# Patient Record
Sex: FEMALE | Race: WHITE | Employment: FULL TIME | URBAN - METROPOLITAN AREA
[De-identification: names, ages, dates, MRNs, and addresses within clinical notes are randomized per-mention and may not be internally consistent; named-entity substitution may affect disease eponyms.]

---

## 2017-07-16 ENCOUNTER — HOSPITAL ENCOUNTER (EMERGENCY)
Facility: CLINIC | Age: 47
Discharge: HOME | End: 2017-07-16
Attending: PHYSICIAN ASSISTANT | Admitting: PHYSICIAN ASSISTANT
Payer: COMMERCIAL

## 2017-07-16 ENCOUNTER — APPOINTMENT (OUTPATIENT)
Dept: GENERAL RADIOLOGY | Facility: CLINIC | Age: 47
End: 2017-07-16
Attending: PHYSICIAN ASSISTANT
Payer: COMMERCIAL

## 2017-07-16 VITALS
DIASTOLIC BLOOD PRESSURE: 91 MMHG | SYSTOLIC BLOOD PRESSURE: 154 MMHG | WEIGHT: 158 LBS | OXYGEN SATURATION: 98 % | HEIGHT: 65 IN | RESPIRATION RATE: 16 BRPM | BODY MASS INDEX: 26.33 KG/M2 | TEMPERATURE: 98.1 F | HEART RATE: 92 BPM

## 2017-07-16 DIAGNOSIS — S89.92XA INJURY OF LOWER EXTREMITY, LEFT, INITIAL ENCOUNTER: ICD-10-CM

## 2017-07-16 PROCEDURE — 99214 OFFICE O/P EST MOD 30 MIN: CPT

## 2017-07-16 PROCEDURE — 99213 OFFICE O/P EST LOW 20 MIN: CPT | Performed by: PHYSICIAN ASSISTANT

## 2017-07-16 PROCEDURE — 73590 X-RAY EXAM OF LOWER LEG: CPT | Mod: LT

## 2017-07-16 NOTE — ED NOTES
Patient here for pain in the left leg after a fall at home.  Patient presents in wheelchair to the urgent care.

## 2017-07-16 NOTE — ED AVS SNAPSHOT
[facility] Emergency Department    [address] [address]    Phone:  [phone]    Fax:  [phone]                                       Gladys Mullins   MRN: [id number]    Department:  [facility] Emergency Department   Date of Visit:  7/16/2017           After Visit Summary Signature Page     I have received my discharge instructions, and my questions have been answered. I have discussed any challenges I see with this plan with the nurse or doctor.    ..........................................................................................................................................  Patient/Patient Representative Signature      ..........................................................................................................................................  Patient Representative Print Name and Relationship to Patient    ..................................................               ................................................  Date                                            Time    ..........................................................................................................................................  Reviewed by Signature/Title    ...................................................              ..............................................  Date                                                            Time

## 2017-07-16 NOTE — ED AVS SNAPSHOT
[facility] Emergency Department    [address] [address]    Phone:  [phone]    Fax:  [phone]                                       Gladys Mullins   MRN: [id number]    Department:  [facility] Emergency Department   Date of Visit:  7/16/2017           Patient Information     Date Of Birth          1970        Your diagnoses for this visit were:     Injury of lower extremity, left, initial encounter        You were seen by Jake Mg PA-C.      24 Hour Appointment Hotline       To make an appointment at any [facility], call [phone] ([phone]). If you don't have a family doctor or clinic, we will help you find one. [facility] clinics are conveniently located to serve the needs of you and your family.          ED Discharge Orders     Cam Walker Adj Ankle                    Review of your medicines      Our records show that you are taking the medicines listed below. If these are incorrect, please call your family doctor or clinic.        Dose / Directions Last dose taken    * lisinopril-hydrochlorothiazide 20-25 MG per tablet   Commonly known as:  PRINZIDE/ZESTORETIC   Dose:  1 tablet        Take 1 tablet by mouth daily.   Refills:  0        * LISINOPRIL-HYDROCHLOROTHIAZIDE PO   Dose:  1 tablet        Take 1 tablet by mouth daily.   Refills:  0        PEPCID PO   Dose:  20 mg        Take 20 mg by mouth daily as needed.   Refills:  0        * Notice:  This list has 2 medication(s) that are the same as other medications prescribed for you. Read the directions carefully, and ask your doctor or other care provider to review them with you.            Procedures and tests performed during your visit     Tib/Fib XR, left      Orders Needing Specimen Collection     None      Pending Results     Date and Time Order Name Status Description    7/16/2017 1528 Tib/Fib XR, left Preliminary             Pending Culture Results     No orders found from 7/14/2017 to  "2017.            Pending Results Instructions     If you had any lab results that were not finalized at the time of your Discharge, you can call the ED Lab Result RN at [phone]. You will be contacted by this team for any positive Lab results or changes in treatment. The nurses are available 7 days a week from 10A to 6:30P.  You can leave a message 24 hours per day and they will return your call.        Test Results From Your Hospital Stay        2017  3:45 PM      Narrative     TIBIA AND FIBULA LEFT TWO VIEW   2017 3:36 PM     HISTORY: Midshaft pain after fell through some stairs 2 days ago.    COMPARISON: None.        Impression     IMPRESSION: No evidence of fracture. Bony alignment within normal  limits.                Thank you for choosing [facility]       Thank you for choosing [facility] for your care. Our goal is always to provide you with excellent care. Hearing back from our patients is one way we can continue to improve our services. Please take a few minutes to complete the written survey that you may receive in the mail after you visit with us. Thank you!        [employer] Information     [employer] lets you send messages to your doctor, view your test results, renew your prescriptions, schedule appointments and more. To sign up, go to [email or web address] . Click on \"Log in\" on the left side of the screen, which will take you to the Welcome page. Then click on \"Sign up Now\" on the right side of the page.     You will be asked to enter the access code listed below, as well as some personal information. Please follow the directions to create your username and password.     Your access code is: [email or web address]  Expires: 10/14/2017  3:55 PM     Your access code will  in 90 days. If you need help or a new code, please call your [facility] clinic or [phone].        Care EveryWhere ID     This is your Care EveryWhere ID. This could be used by other organizations to access your [facility] " medical records  [id number]        Equal Access to Services     ELKE AVILA : Zonia West, mere bartlett, kari pham, waldo naranjo. So [facility] [phone].    ATENCIÓN: Si habla español, tiene a prasad disposición servicios gratuitos de asistencia lingüística. Llame al [phone].    We comply with applicable federal civil rights laws and [place] laws. We do not discriminate on the basis of race, color, national origin, age, disability sex, sexual orientation or gender identity.            After Visit Summary       This is your record. Keep this with you and show to your community pharmacist(s) and doctor(s) at your next visit.

## 2017-07-16 NOTE — ED PROVIDER NOTES
"HPI: Gladys Mullins is an 47 year old female who presents for evaluation and treatment of L leg pain.  Patient was walking down some stairs at home 2 days ago when her foot broke through.  She lurched forward, and her leg remained stuck in the stairs.  She had immediate pain.  She has been walking on it for 2 days with discomfort.  She did use a relatives cam walker, and this did help with the pain.  She denies any numbness or tingling in the L foot.           Surgical History:  History reviewed. No pertinent surgical history.     Problem List:  There is no problem list on file for this patient.       Allergies:  Allergies   Allergen Reactions     Codeine Phosphate GI Disturbance        Current Meds:  No current facility-administered medications for this encounter.     Current Outpatient Prescriptions:      lisinopril-hydrochlorothiazide (PRINZIDE,ZESTORETIC) 20-25 MG per tablet, Take 1 tablet by mouth daily., Disp: , Rfl:      LISINOPRIL-HYDROCHLOROTHIAZIDE PO, Take 1 tablet by mouth daily., Disp: , Rfl:      Famotidine (PEPCID PO), Take 20 mg by mouth daily as needed., Disp: , Rfl:      PHYSICAL EXAM:     Vital signs noted and reviewed by Jake Mg  BP (!) 154/91  Pulse 92  Temp 98.1  F (36.7  C) (Oral)  Resp 16  Ht 1.651 m (5' 5\")  Wt 71.7 kg (158 lb)  SpO2 98%  BMI 26.29 kg/m2     PEFR:  General appearance: healthy, alert and no distress  Lungs: normal and clear to auscultation  Heart: S1, S2 normal, no murmur, click, rub or gallop, regular rate and rhythm  Extremities: L leg - No swelling, bruising, or deformity.  Severe pain with manipulation of the fibula mid shaft area.  Full range of motion of ankle and digits.  Digits are neurologically intact.      ASSESSMENT:     1. Injury of lower extremity, left, initial encounter         PLAN:     XR per radiology read: No evidence of fracture. Bony alignment within normal limits.    Patient placed in cam walker supplied in clinic for comfort.  Ice, " elevation, and ibuprofen for comfort.  She was instructed to follow up with her PCP in 2 weeks if symptoms are not improving.     Jake Mg  7/16/2017, 3:25 PM       Jake Mg PA-C  07/16/17 1554

## 2019-11-18 ENCOUNTER — HOSPITAL ENCOUNTER (EMERGENCY)
Facility: CLINIC | Age: 49
Discharge: HOME | End: 2019-11-18
Attending: FAMILY MEDICINE | Admitting: FAMILY MEDICINE
Payer: COMMERCIAL

## 2019-11-18 VITALS
RESPIRATION RATE: 16 BRPM | BODY MASS INDEX: 24.99 KG/M2 | HEIGHT: 65 IN | TEMPERATURE: 98 F | WEIGHT: 150 LBS | DIASTOLIC BLOOD PRESSURE: 93 MMHG | SYSTOLIC BLOOD PRESSURE: 171 MMHG | OXYGEN SATURATION: 98 %

## 2019-11-18 DIAGNOSIS — S05.02XA ABRASION OF LEFT CORNEA, INITIAL ENCOUNTER: ICD-10-CM

## 2019-11-18 PROCEDURE — 99283 EMERGENCY DEPT VISIT LOW MDM: CPT

## 2019-11-18 PROCEDURE — 25000132 ZZH RX MED GY IP 250 OP 250 PS 637: Performed by: FAMILY MEDICINE

## 2019-11-18 PROCEDURE — 99284 EMERGENCY DEPT VISIT MOD MDM: CPT | Mod: Z6 | Performed by: FAMILY MEDICINE

## 2019-11-18 PROCEDURE — 25000125 ZZHC RX 250: Performed by: FAMILY MEDICINE

## 2019-11-18 RX ORDER — FLUOXETINE 10 MG/1
10 CAPSULE ORAL EVERY MORNING
Status: ON HOLD | COMMUNITY
Start: 2019-09-16 | End: 2023-11-30

## 2019-11-18 RX ORDER — TETRACAINE HYDROCHLORIDE 5 MG/ML
1-2 SOLUTION OPHTHALMIC ONCE
Status: COMPLETED | OUTPATIENT
Start: 2019-11-18 | End: 2019-11-18

## 2019-11-18 RX ORDER — POLYMYXIN B SULFATE AND TRIMETHOPRIM 1; 10000 MG/ML; [USP'U]/ML
2 SOLUTION OPHTHALMIC ONCE
Status: COMPLETED | OUTPATIENT
Start: 2019-11-18 | End: 2019-11-18

## 2019-11-18 RX ORDER — HYDROCODONE BITARTRATE AND ACETAMINOPHEN 5; 325 MG/1; MG/1
1-2 TABLET ORAL EVERY 6 HOURS PRN
Qty: 6 TABLET | Refills: 0 | Status: SHIPPED | OUTPATIENT
Start: 2019-11-18 | End: 2022-06-21

## 2019-11-18 RX ORDER — TRAZODONE HYDROCHLORIDE 50 MG/1
50 TABLET, FILM COATED ORAL AT BEDTIME
COMMUNITY
Start: 2019-05-31

## 2019-11-18 RX ADMIN — TETRACAINE HYDROCHLORIDE 1 DROP: 5 SOLUTION OPHTHALMIC at 13:23

## 2019-11-18 RX ADMIN — POLYMYXIN B SULFATE AND TRIMETHOPRIM 2 DROP: 10000; 1 SOLUTION OPHTHALMIC at 14:08

## 2019-11-18 ASSESSMENT — MIFFLIN-ST. JEOR: SCORE: 1311.28

## 2019-11-18 NOTE — LETTER
November 18, 2019      To Whom It May Concern:      Gladys Mullins was seen in our Emergency Department today, 11/18/19.  I expect her condition to improve over the next 2 days.  She may return to work/school when improved.    Sincerely,        Jose Manuel Goncalves MD
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
## 2019-11-18 NOTE — DISCHARGE INSTRUCTIONS
Corneal Abrasion    You have received a scratch or scrape (abrasion) to your cornea. The cornea is the clear part in the front of the eye. This sensitive area is very painful when injured. You may make tears frequently, and your vision may be blurry until the injury heals. You may be sensitive to light.  This part of the body heals quickly. You can expect the pain to go away within 24 to 48 hours. If the abrasion is large or deep, your doctor may apply an eye patch, although this is not always done. An antibiotic ointment or eye drops may also be used to prevent infection.  Numbing drops may be used to relieve the pain temporarily so that your eyes can be examined. But these drops can t be prescribed for home use because that would prevent healing and lead to more serious problems. Also, if you can t feel your eye, there is a chance of accidentally injuring it further without knowing it.  Home care  A cold pack may be applied over the eye (or eye patch) for 20 minutes at a time, to reduce pain. To make a cold pack, put ice cubes in a plastic bag that seals at the top. Wrap the bag in a clean, thin towel or cloth.  You may use acetaminophen or ibuprofen to control pain, unless another pain medicine was prescribed. Note: If you have chronic liver or kidney disease or have ever had a stomach ulcer or GI bleeding, talk with your doctor before using these medicines.  Rest your eyes and don t read until symptoms are gone.  If you use contact lenses, don t wear them until all symptoms are gone.  If your vision is affected by the corneal abrasion or if an eye patch was applied, don t drive a motor vehicle or operate machinery until all symptoms are gone. You may have trouble judging distances using only one eye.  If your eyes are sensitive to light, try wearing sunglasses, or stay indoors until symptoms go away.  Follow-up care  Follow up with your healthcare provider, or as advised.  If no patch was put on your eye and  the pain continues for more than 48 hours, you should have another exam. Contact your healthcare provider to arrange this.  If your eye was patched and you were asked to remove the patch yourself, see your healthcare provider. Contact your healthcare provider if you still have pain after the patch is removed.  If you were given a return appointment for patch removal and re-examination, be sure to keep the appointment. Leaving the patch in place longer than advised could be harmful.  When to seek medical advice  Call your healthcare provider right away if any of these occur.  Increasing eye pain or pain that does not improve after 24 hours  Discharge from the eye  Increasing redness of the eye or swelling of the eyelids  Worsening vision  Symptoms get worse after the abrasion has healed      Date Last Reviewed: 7/1/2017 2000-2018 [employer]. [address] [place] [address]. All rights reserved. This information is not intended as a substitute for professional medical care. Always follow your healthcare professional's instructions.      Return to the Emergency Room if the following occurs:     Severely worsened pain, fever greater than 101, concerning visual changes, or for any concern at anytime.    Or, follow-up with the following provider as we discussed:     Consider [facility] if not improving over the next 48 hours.  See contact information provided for details.    Medications discussed:    Ibuprofen 600 mg every six hours for pain (7 days duration).  Tylenol 1000 mg every six hours for pain (7 days duration).  Therefore, you can alternate these every three hours and do it safely.  Norco (5/325) 1-2 tablets every 8 hours for pain (start with one).  Note, each tab has 325 mg tylenol.  Do not exceed 3000 mg tylenol per 24 hours.  Tetracaine 2 drops every two hours as needed for pain, not to be used beyond 24 hours.  Polytrim 2 drops 3 times a  day over the next 3 days.  Your first dose was given in the ER.    If you received pain-relieving or sedating medication during your time in the ER, avoid alcohol, driving automobiles, or working with machinery.  Also, a responsible adult must stay with you.        Call the Nurse Advice Line at [phone] or [phone] for any concern at anytime.

## 2019-11-18 NOTE — ED AVS SNAPSHOT
[facility] Emergency Department  [address] [address]  Phone:  [phone]  Fax:  [phone]                                    Gladys Mullins   MRN: [id number]    Department:  [facility] Emergency Department   Date of Visit:  11/18/2019           After Visit Summary Signature Page    I have received my discharge instructions, and my questions have been answered. I have discussed any challenges I see with this plan with the nurse or doctor.    ..........................................................................................................................................  Patient/Patient Representative Signature      ..........................................................................................................................................  Patient Representative Print Name and Relationship to Patient    ..................................................               ................................................  Date                                   Time    ..........................................................................................................................................  Reviewed by Signature/Title    ...................................................              ..............................................  Date                                               Time          22EPIC Rev 08/18

## 2022-06-21 ENCOUNTER — HOSPITAL ENCOUNTER (EMERGENCY)
Facility: CLINIC | Age: 52
Discharge: HOME | End: 2022-06-21
Attending: EMERGENCY MEDICINE | Admitting: EMERGENCY MEDICINE
Payer: COMMERCIAL

## 2022-06-21 ENCOUNTER — APPOINTMENT (OUTPATIENT)
Dept: GENERAL RADIOLOGY | Facility: CLINIC | Age: 52
End: 2022-06-21
Attending: EMERGENCY MEDICINE
Payer: COMMERCIAL

## 2022-06-21 VITALS
SYSTOLIC BLOOD PRESSURE: 106 MMHG | HEART RATE: 85 BPM | TEMPERATURE: 97.1 F | BODY MASS INDEX: 24.99 KG/M2 | OXYGEN SATURATION: 98 % | WEIGHT: 150 LBS | HEIGHT: 65 IN | DIASTOLIC BLOOD PRESSURE: 69 MMHG | RESPIRATION RATE: 18 BRPM

## 2022-06-21 DIAGNOSIS — S93.602A FOOT SPRAIN, LEFT, INITIAL ENCOUNTER: ICD-10-CM

## 2022-06-21 PROCEDURE — 99282 EMERGENCY DEPT VISIT SF MDM: CPT | Performed by: EMERGENCY MEDICINE

## 2022-06-21 PROCEDURE — 73630 X-RAY EXAM OF FOOT: CPT | Mod: LT

## 2022-06-21 PROCEDURE — 99283 EMERGENCY DEPT VISIT LOW MDM: CPT | Performed by: EMERGENCY MEDICINE

## 2022-06-21 RX ORDER — HYDROCODONE BITARTRATE AND ACETAMINOPHEN 5; 325 MG/1; MG/1
1 TABLET ORAL EVERY 6 HOURS PRN
Qty: 3 TABLET | Refills: 0 | Status: ON HOLD | OUTPATIENT
Start: 2022-06-21 | End: 2023-11-30

## 2022-06-21 ASSESSMENT — ENCOUNTER SYMPTOMS
GASTROINTESTINAL NEGATIVE: 1
EYES NEGATIVE: 1
ALLERGIC/IMMUNOLOGIC NEGATIVE: 1
ENDOCRINE NEGATIVE: 1
HEMATOLOGIC/LYMPHATIC NEGATIVE: 1
CONSTITUTIONAL NEGATIVE: 1
PSYCHIATRIC NEGATIVE: 1
RESPIRATORY NEGATIVE: 1
NEUROLOGICAL NEGATIVE: 1
CARDIOVASCULAR NEGATIVE: 1

## 2022-06-21 NOTE — ED TRIAGE NOTES
Injured left foot last night      Triage Assessment     Row Name 06/21/22 0051       Triage Assessment (Adult)    Airway WDL WDL       Skin Circulation/Temperature WDL    Skin Circulation/Temperature WDL WDL       Peripheral/Neurovascular WDL    Peripheral Neurovascular WDL WDL       Cognitive/Neuro/Behavioral WDL    Cognitive/Neuro/Behavioral WDL WDL

## 2022-06-21 NOTE — ED PROVIDER NOTES
History     Chief Complaint   Patient presents with     Foot Pain     HPI  Gladys Mullins is a 52 year old female who presents for evaluation of left foot and ankle injury the night prior.  Medical records show she was prescribed famotidine, Prozac, Zestoretic and trazodone.  On examination patient was accompanied by her son Cachorro.  She reports she stepped out of her home pool this evening around 7:30 PM and since then has had persistent pain around the first metatarsal head and the midfoot.  She has a previous history of trimalleolar fracture involving her right foot and ankle.  No prior history of injury to the left foot    Allergies:  Allergies   Allergen Reactions     Codeine Phosphate GI Disturbance       Problem List:    There are no problems to display for this patient.       Past Medical History:    Past Medical History:   Diagnosis Date     Hypertension        Past Surgical History:    No past surgical history on file.    Family History:    No family history on file.    Social History:  Marital Status:   [2]  Social History     Tobacco Use     Smoking status: Current Every Day Smoker     Smokeless tobacco: Never Used   Substance Use Topics     Alcohol use: Yes     Comment: social     Drug use: No        Medications:    HYDROcodone-acetaminophen (NORCO) 5-325 MG tablet  Famotidine (PEPCID PO)  FLUoxetine (PROZAC) 10 MG capsule  lisinopril-hydrochlorothiazide (PRINZIDE,ZESTORETIC) 20-25 MG per tablet  traZODone (DESYREL) 50 MG tablet          Review of Systems   Constitutional: Negative.    HENT: Negative.    Eyes: Negative.    Respiratory: Negative.    Cardiovascular: Negative.    Gastrointestinal: Negative.    Endocrine: Negative.    Genitourinary: Negative.    Musculoskeletal:        Left foot pain   Skin: Negative.    Allergic/Immunologic: Negative.    Neurological: Negative.    Hematological: Negative.    Psychiatric/Behavioral: Negative.    All other systems reviewed and are  "negative.      Physical Exam   BP: 106/69  Pulse: 85  Temp: 97.1  F (36.2  C)  Resp: 18  Height: 165.1 cm (5' 5\")  Weight: 68 kg (150 lb)  SpO2: 98 %      Physical Exam  Constitutional:       General: She is not in acute distress.     Appearance: Normal appearance. She is not ill-appearing, toxic-appearing or diaphoretic.   HENT:      Head: Normocephalic and atraumatic.      Right Ear: Tympanic membrane normal.      Left Ear: Tympanic membrane normal.      Nose: Nose normal.      Mouth/Throat:      Mouth: Mucous membranes are moist.   Eyes:      Extraocular Movements: Extraocular movements intact.      Pupils: Pupils are equal, round, and reactive to light.   Cardiovascular:      Rate and Rhythm: Normal rate and regular rhythm.   Pulmonary:      Effort: Pulmonary effort is normal. No respiratory distress.      Breath sounds: Normal breath sounds. No stridor. No wheezing, rhonchi or rales.   Chest:      Chest wall: No tenderness.   Musculoskeletal:         General: Tenderness present. Normal range of motion.      Cervical back: Normal range of motion and neck supple.        Legs:    Skin:     Capillary Refill: Capillary refill takes less than 2 seconds.      Coloration: Skin is not jaundiced or pale.      Findings: No bruising, erythema or rash.   Neurological:      General: No focal deficit present.      Mental Status: She is alert and oriented to person, place, and time.      Cranial Nerves: No cranial nerve deficit.      Sensory: No sensory deficit.      Motor: No weakness.      Coordination: Coordination normal.      Gait: Gait normal.      Deep Tendon Reflexes: Reflexes normal.   Psychiatric:         Mood and Affect: Mood normal.         Behavior: Behavior normal.         Thought Content: Thought content normal.         Judgment: Judgment normal.         ED Course                 Procedures              Critical Care time:  none               ED medications: none    ED vitals:  Vitals:    06/21/22 0052   BP: " "106/69   Pulse: 85   Resp: 18   Temp: 97.1  F (36.2  C)   TempSrc: Tympanic   SpO2: 98%   Weight: 68 kg (150 lb)   Height: 1.651 m (5' 5\")     ED labs and imaging:  Results for orders placed or performed during the hospital encounter of 06/21/22   Foot XR, G/E 3 views, left     Status: None    Narrative    EXAM: XR FOOT LEFT G/E 3 VIEWS  LOCATION: [facility]  DATE/TIME: 6/21/2022 2:11 AM    INDICATION: Injury, pain.  COMPARISON: None.      Impression    IMPRESSION: Normal joint spaces and alignment. No fracture.           Assessments & Plan (with Medical Decision Making)   Assessment Summary and Clinical Impression: 52-year-old female presented left foot and ankle injury  earlier this evening prior to ED arrival.  Patient reports she stepped out of her pool at home and developed sudden onset of pain and discomfort.  She did not roll her ankle.  She has no numbness about the foot no ankle pain no knee pain and no hip pain.  Due to increasing pain with weightbearing about the base of the first toe and midfoot she presents for further care and evaluation.  X-rays of the left foot revealed no acute bony fracture or dislocation.  She was discharged home with a foot sprain with outlined care measures to help manage her pain and discomfort with follow-up imaging if persistent discomfort as an outpatient.    ED course and plan:  Reviewed the medical record.  Patient rated her pain a 3 out of 10 on arrival and she was offered medications to manage her pain which she declined because she had taken some ibuprofen which was helping her pain and discomfort.  Ice was applied to the area of pain and discomfort extremity of the foot was obtained.  X-ray imaging was reviewed independently and the radiology report was also reviewed.  Patient was reassured that her x-ray imaging was unrevealing for bony process.  She was offered an orthopedic shoe which she declined.  She requested something stronger for " pain for home and was given 3 tablets of Norco.  We discussed that if she has persistent foot pain despite care measures reviewed including ice and/or heat, elevating, weightbearing as tolerated repeat imaging may be beneficial.  She expressed comfort, understanding and agreement with plan of care.      Disclaimer: This note consists of symbols derived from keyboarding, dictation and/or voice recognition software. As a result, there may be errors in the script that have gone undetected. Please consider this when interpreting information found in this chart.  I have reviewed the nursing notes.    I have reviewed the findings, diagnosis, plan and need for follow up with the patient.       New Prescriptions    HYDROCODONE-ACETAMINOPHEN (NORCO) 5-325 MG TABLET    Take 1 tablet by mouth every 6 hours as needed for severe pain       Final diagnoses:   Foot sprain, left, initial encounter       6/21/2022   [facility] EMERGENCY DEPT     Aly Mccarty MD  06/21/22 4564

## 2022-06-21 NOTE — DISCHARGE INSTRUCTIONS
1) Your evaluation did not reveal any obvious fracture in your foot. You were offered an orthopedic shoe or cam boot which you declined.  Apply heat or ice to the foot whenever feels good.  You have requested something stronger for pain in the event you have persistent discomfort.  We have agreed to have you manage her symptoms with rest ice elevate heat and you are given 3 tablets of Norco to use for additional pain management if needed.    2) although your x-ray imaging today did not show any broken bones or dislocation in the foot if you have persistent pain or discomfort despite therapies and the plan outlined above and discussed repeat imaging may be beneficial.

## 2022-07-21 ENCOUNTER — HOSPITAL ENCOUNTER (EMERGENCY)
Facility: CLINIC | Age: 52
Discharge: HOME | End: 2022-07-21
Attending: EMERGENCY MEDICINE | Admitting: EMERGENCY MEDICINE
Payer: COMMERCIAL

## 2022-07-21 ENCOUNTER — APPOINTMENT (OUTPATIENT)
Dept: ULTRASOUND IMAGING | Facility: CLINIC | Age: 52
End: 2022-07-21
Attending: EMERGENCY MEDICINE
Payer: COMMERCIAL

## 2022-07-21 VITALS
BODY MASS INDEX: 26.66 KG/M2 | HEART RATE: 65 BPM | SYSTOLIC BLOOD PRESSURE: 143 MMHG | WEIGHT: 160 LBS | HEIGHT: 65 IN | OXYGEN SATURATION: 97 % | RESPIRATION RATE: 18 BRPM | DIASTOLIC BLOOD PRESSURE: 86 MMHG | TEMPERATURE: 98.4 F

## 2022-07-21 DIAGNOSIS — R74.01 ELEVATED LIVER TRANSAMINASE LEVEL: ICD-10-CM

## 2022-07-21 DIAGNOSIS — R11.2 NAUSEA AND VOMITING, INTRACTABILITY OF VOMITING NOT SPECIFIED, UNSPECIFIED VOMITING TYPE: ICD-10-CM

## 2022-07-21 DIAGNOSIS — R10.13 ABDOMINAL PAIN, EPIGASTRIC: ICD-10-CM

## 2022-07-21 LAB
ALBUMIN SERPL-MCNC: 3.6 G/DL (ref 3.4–5)
ALP SERPL-CCNC: 91 U/L (ref 40–150)
ALT SERPL W P-5'-P-CCNC: 237 U/L (ref 0–50)
ANION GAP SERPL CALCULATED.3IONS-SCNC: 11 MMOL/L (ref 3–14)
AST SERPL W P-5'-P-CCNC: 335 U/L (ref 0–45)
BASOPHILS # BLD AUTO: 0.1 10E3/UL (ref 0–0.2)
BASOPHILS NFR BLD AUTO: 1 %
BILIRUB SERPL-MCNC: 0.6 MG/DL (ref 0.2–1.3)
BUN SERPL-MCNC: 6 MG/DL (ref 7–30)
CALCIUM SERPL-MCNC: 9.5 MG/DL (ref 8.5–10.1)
CHLORIDE BLD-SCNC: 101 MMOL/L (ref 94–109)
CO2 SERPL-SCNC: 26 MMOL/L (ref 20–32)
CREAT SERPL-MCNC: 0.64 MG/DL (ref 0.52–1.04)
EOSINOPHIL # BLD AUTO: 0 10E3/UL (ref 0–0.7)
EOSINOPHIL NFR BLD AUTO: 0 %
ERYTHROCYTE [DISTWIDTH] IN BLOOD BY AUTOMATED COUNT: 14.4 % (ref 10–15)
GFR SERPL CREATININE-BSD FRML MDRD: >90 ML/MIN/1.73M2
GLUCOSE BLD-MCNC: 87 MG/DL (ref 70–99)
HCT VFR BLD AUTO: 39.3 % (ref 35–47)
HGB BLD-MCNC: 13.2 G/DL (ref 11.7–15.7)
HOLD SPECIMEN: NORMAL
IMM GRANULOCYTES # BLD: 0 10E3/UL
IMM GRANULOCYTES NFR BLD: 1 %
INR PPP: 0.99 (ref 0.85–1.15)
LIPASE SERPL-CCNC: 330 U/L (ref 73–393)
LYMPHOCYTES # BLD AUTO: 2 10E3/UL (ref 0.8–5.3)
LYMPHOCYTES NFR BLD AUTO: 39 %
MCH RBC QN AUTO: 34.4 PG (ref 26.5–33)
MCHC RBC AUTO-ENTMCNC: 33.6 G/DL (ref 31.5–36.5)
MCV RBC AUTO: 102 FL (ref 78–100)
MONOCYTES # BLD AUTO: 0.6 10E3/UL (ref 0–1.3)
MONOCYTES NFR BLD AUTO: 12 %
NEUTROPHILS # BLD AUTO: 2.3 10E3/UL (ref 1.6–8.3)
NEUTROPHILS NFR BLD AUTO: 47 %
NRBC # BLD AUTO: 0 10E3/UL
NRBC BLD AUTO-RTO: 0 /100
PLATELET # BLD AUTO: 159 10E3/UL (ref 150–450)
POTASSIUM BLD-SCNC: 3.1 MMOL/L (ref 3.4–5.3)
PROT SERPL-MCNC: 8.1 G/DL (ref 6.8–8.8)
RBC # BLD AUTO: 3.84 10E6/UL (ref 3.8–5.2)
SODIUM SERPL-SCNC: 138 MMOL/L (ref 133–144)
WBC # BLD AUTO: 5 10E3/UL (ref 4–11)

## 2022-07-21 PROCEDURE — 96360 HYDRATION IV INFUSION INIT: CPT | Performed by: EMERGENCY MEDICINE

## 2022-07-21 PROCEDURE — 85004 AUTOMATED DIFF WBC COUNT: CPT | Performed by: EMERGENCY MEDICINE

## 2022-07-21 PROCEDURE — 85610 PROTHROMBIN TIME: CPT | Performed by: EMERGENCY MEDICINE

## 2022-07-21 PROCEDURE — 96361 HYDRATE IV INFUSION ADD-ON: CPT | Performed by: EMERGENCY MEDICINE

## 2022-07-21 PROCEDURE — 99284 EMERGENCY DEPT VISIT MOD MDM: CPT | Performed by: EMERGENCY MEDICINE

## 2022-07-21 PROCEDURE — 250N000011 HC RX IP 250 OP 636: Performed by: FAMILY MEDICINE

## 2022-07-21 PROCEDURE — 76705 ECHO EXAM OF ABDOMEN: CPT

## 2022-07-21 PROCEDURE — 80053 COMPREHEN METABOLIC PANEL: CPT | Performed by: EMERGENCY MEDICINE

## 2022-07-21 PROCEDURE — 258N000003 HC RX IP 258 OP 636: Performed by: EMERGENCY MEDICINE

## 2022-07-21 PROCEDURE — 36415 COLL VENOUS BLD VENIPUNCTURE: CPT | Performed by: EMERGENCY MEDICINE

## 2022-07-21 PROCEDURE — 99284 EMERGENCY DEPT VISIT MOD MDM: CPT | Mod: 25 | Performed by: EMERGENCY MEDICINE

## 2022-07-21 PROCEDURE — 83690 ASSAY OF LIPASE: CPT | Performed by: EMERGENCY MEDICINE

## 2022-07-21 RX ORDER — ONDANSETRON 4 MG/1
4 TABLET, ORALLY DISINTEGRATING ORAL EVERY 6 HOURS PRN
Qty: 10 TABLET | Refills: 0 | Status: SHIPPED | OUTPATIENT
Start: 2022-07-21 | End: 2022-07-24

## 2022-07-21 RX ORDER — ONDANSETRON 4 MG/1
4 TABLET, ORALLY DISINTEGRATING ORAL ONCE
Status: COMPLETED | OUTPATIENT
Start: 2022-07-21 | End: 2022-07-21

## 2022-07-21 RX ORDER — ONDANSETRON 2 MG/ML
4 INJECTION INTRAMUSCULAR; INTRAVENOUS ONCE
Status: DISCONTINUED | OUTPATIENT
Start: 2022-07-21 | End: 2022-07-21 | Stop reason: HOSPADM

## 2022-07-21 RX ADMIN — SODIUM CHLORIDE, POTASSIUM CHLORIDE, SODIUM LACTATE AND CALCIUM CHLORIDE 1000 ML: 600; 310; 30; 20 INJECTION, SOLUTION INTRAVENOUS at 16:14

## 2022-07-21 RX ADMIN — ONDANSETRON 4 MG: 4 TABLET, ORALLY DISINTEGRATING ORAL at 13:48

## 2022-07-21 NOTE — DISCHARGE INSTRUCTIONS
Abstain from alcohol    Go to AA    Consider evaluation regarding treatment for alcohol use    Prilosec 40 mg daily, avoid NSAIDs    Clear liquid diet advance as tolerated    Return here for abdominal pain, vomiting, fever or any other concern.

## 2022-07-21 NOTE — ED TRIAGE NOTES
Pt reports RUQ pain that has be ongoing for a couple weeks now with cyclic vomiting, pt reports she has had a poor appetite and not able to keep much down. Pt denies fevers at this time      Triage Assessment     Row Name 07/21/22 1346       Sky Coma Scale    Best Eye Response 4-->(E4) spontaneous    Best Motor Response 6-->(M6) obeys commands    Best Verbal Response 5-->(V5) oriented    Sky Coma Scale Score 15

## 2022-07-21 NOTE — ED PROVIDER NOTES
History     Chief Complaint   Patient presents with     Abdominal Pain     Pt reports RUQ pain that has be ongoing for a couple weeks now with cyclic vomiting, pt reports she has had a poor appetite and not able to keep much down. Pt denies fevers at this time      HPI  Gladys Mullins is a 52 year old female who presents with nausea vomiting x2 weeks, poor oral intake of solids and fluids, although able to tolerate some alcohol this morning.  Reports alcohol intake some 5-6 cocktails per night.  She has not had anyone expressed concern regarding her alcohol use, she does have some concern regarding her alcohol use.  She is a smoker.  She denies illicit substance use.  She does not use NSAIDs no history of peptic ulcer disease.  No prior abdominal surgery.  No associated fever.  Describes bowel movements as loose.   currently has COVID.  Patient is vaccinated and boosted for COVID.  Is having GERD symptoms.  Denies hematemesis coffee-ground emesis or history of esophageal varices.  She denies any black or bloody stool.  She had a fall a month ago and was seen and continues to complain of some foot/ankle pain.    Allergies:  Allergies   Allergen Reactions     Codeine Phosphate GI Disturbance       Problem List:    There are no problems to display for this patient.       Past Medical History:    Past Medical History:   Diagnosis Date     Hypertension        Past Surgical History:    No past surgical history on file.    Family History:    No family history on file.    Social History:  Marital Status:   [2]  Social History     Tobacco Use     Smoking status: Current Every Day Smoker     Smokeless tobacco: Never Used   Substance Use Topics     Alcohol use: Yes     Comment: social     Drug use: No        Medications:    Famotidine (PEPCID PO)  FLUoxetine (PROZAC) 10 MG capsule  HYDROcodone-acetaminophen (NORCO) 5-325 MG tablet  lisinopril-hydrochlorothiazide (PRINZIDE,ZESTORETIC) 20-25 MG per  "tablet  traZODone (DESYREL) 50 MG tablet          Review of Systems  All other systems reviewed and are negative.    Physical Exam   BP: (!) 133/90  Pulse: 93  Temp: 98.4  F (36.9  C)  Resp: 18  Height: 165.1 cm (5' 5\")  Weight: 72.6 kg (160 lb)  SpO2: 97 %      Physical Exam  Nontoxic appearing no respiratory distress alert and oriented ×3  Head atraumatic normocephalic  Neck supple full active painless range of motion  Lungs clear to auscultation  Heart regular no murmur  Abdomen soft moderate tenderness right upper quadrant and epigastrium without guarding or rebound, bowel sounds are diminished, nondistended  Strength and sensation grossly intact throughout the extremities, gait and station normal  Speech is fluent, good eye contact, thought processes are rational  Lower extremities without swelling, redness or tenderness  Pedal pulses symmetrical and strong    ED Course          Results for orders placed or performed during the hospital encounter of 07/21/22   Abdomen US, limited (RUQ only)     Status: None    Narrative    US ABDOMEN LIMITED 7/21/2022 4:51 PM    CLINICAL HISTORY: Epigastric right upper quadrant pain and tenderness  vomiting, same  TECHNIQUE: Limited abdominal ultrasound.    COMPARISON: None.    FINDINGS:    GALLBLADDER: The gallbladder is normal. No gallstones, wall  thickening, or pericholecystic fluid. Negative sonographic See's  sign.    BILE DUCTS: There is no biliary dilatation. The common duct measures  5mm.    LIVER: Diffuse hepatic steatosis. No focal hepatic mass.    RIGHT KIDNEY: No hydronephrosis.    PANCREAS: The visualized portions of the pancreas are normal.      Impression    IMPRESSION:  1.  Normal gallbladder. No biliary ductal dilatation.  2.  Hepatic steatosis.    RUSS DOWLING MD         SYSTEM ID:  [id number]   Mina Draw     Status: None    Narrative    The following orders were created for panel order Mina Draw.  Procedure                               Abnormality  "        Status                     ---------                               -----------         ------                     Extra Blue Top Tube[[id number]]                              Final result               Extra Red Top Tube[[id number]]                               Final result               Extra Green Top (Lithium...[[id number]]                      Final result               Extra Purple Top Tube[[id number]]                            Final result                 Please view results for these tests on the individual orders.   Extra Blue Top Tube     Status: None   Result Value Ref Range    Hold Specimen JIC    Extra Red Top Tube     Status: None   Result Value Ref Range    Hold Specimen JIC    Extra Green Top (Lithium Heparin) Tube     Status: None   Result Value Ref Range    Hold Specimen JIC    Extra Purple Top Tube     Status: None   Result Value Ref Range    Hold Specimen JIC    Comprehensive metabolic panel     Status: Abnormal   Result Value Ref Range    Sodium 138 133 - 144 mmol/L    Potassium 3.1 (L) 3.4 - 5.3 mmol/L    Chloride 101 94 - 109 mmol/L    Carbon Dioxide (CO2) 26 20 - 32 mmol/L    Anion Gap 11 3 - 14 mmol/L    Urea Nitrogen 6 (L) 7 - 30 mg/dL    Creatinine 0.64 0.52 - 1.04 mg/dL    Calcium 9.5 8.5 - 10.1 mg/dL    Glucose 87 70 - 99 mg/dL    Alkaline Phosphatase 91 40 - 150 U/L     (H) 0 - 45 U/L     (H) 0 - 50 U/L    Protein Total 8.1 6.8 - 8.8 g/dL    Albumin 3.6 3.4 - 5.0 g/dL    Bilirubin Total 0.6 0.2 - 1.3 mg/dL    GFR Estimate >90 >60 mL/min/1.73m2   INR     Status: Normal   Result Value Ref Range    INR 0.99 0.85 - 1.15   Lipase     Status: Normal   Result Value Ref Range    Lipase 330 73 - 393 U/L   CBC with platelets and differential     Status: Abnormal   Result Value Ref Range    WBC Count 5.0 4.0 - 11.0 10e3/uL    RBC Count 3.84 3.80 - 5.20 10e6/uL    Hemoglobin 13.2 11.7 - 15.7 g/dL    Hematocrit 39.3 35.0 - 47.0 %     (H) 78 - 100 fL    MCH 34.4 (H) 26.5 -  33.0 pg    MCHC 33.6 31.5 - 36.5 g/dL    RDW 14.4 10.0 - 15.0 %    Platelet Count 159 150 - 450 10e3/uL    % Neutrophils 47 %    % Lymphocytes 39 %    % Monocytes 12 %    % Eosinophils 0 %    % Basophils 1 %    % Immature Granulocytes 1 %    NRBCs per 100 WBC 0 <1 /100    Absolute Neutrophils 2.3 1.6 - 8.3 10e3/uL    Absolute Lymphocytes 2.0 0.8 - 5.3 10e3/uL    Absolute Monocytes 0.6 0.0 - 1.3 10e3/uL    Absolute Eosinophils 0.0 0.0 - 0.7 10e3/uL    Absolute Basophils 0.1 0.0 - 0.2 10e3/uL    Absolute Immature Granulocytes 0.0 <=0.4 10e3/uL    Absolute NRBCs 0.0 10e3/uL   CBC with platelets differential     Status: Abnormal    Narrative    The following orders were created for panel order CBC with platelets differential.  Procedure                               Abnormality         Status                     ---------                               -----------         ------                     CBC with platelets and d...[[id number]]  Abnormal            Final result                 Please view results for these tests on the individual orders.            Procedures              Critical Care time:  none               No results found for this or any previous visit (from the past 24 hour(s)).    Medications   ondansetron (ZOFRAN ODT) ODT tab 4 mg (4 mg Oral Given 7/21/22 1348)   lactated ringers BOLUS 1,000 mL (0 mLs Intravenous Stopped 7/21/22 1809)       Assessments & Plan (with Medical Decision Making)  52-year-old female abdominal pain vomiting.  Alcohol use disorder.  Likely alcohol gastritis.  Mild elevation transaminases.  Right upper quadrant ultrasound is unremarkable with exception of hepatic steatosis.  Mild hypokalemia secondary to vomiting.  Ondansetron with resolution of nausea.  Discharged with prescription for same.  Recommend alcohol abstinence, follow-up for alcohol treatment evaluation, recommend AA meetings.  Advance diet as tolerated.  Repeat labs primary care in the near future.  Return here for  worsening pain, fever, vomiting or any other concern     I have reviewed the nursing notes.    I have reviewed the findings, diagnosis, plan and need for follow up with the patient.          Discharge Medication List as of 7/21/2022  6:09 PM      START taking these medications    Details   ondansetron (ZOFRAN ODT) 4 MG ODT tab Take 1 tablet (4 mg) by mouth every 6 hours as needed, Disp-10 tablet, R-0, E-Prescribe             Final diagnoses:   Nausea and vomiting, intractability of vomiting not specified, unspecified vomiting type   Abdominal pain, epigastric   Elevated liver transaminase level       7/21/2022   [facility] EMERGENCY DEPT     Ifeanyi Gregory MD  07/25/22 9464

## 2023-05-22 ENCOUNTER — APPOINTMENT (OUTPATIENT)
Dept: GENERAL RADIOLOGY | Facility: CLINIC | Age: 53
End: 2023-05-22
Attending: EMERGENCY MEDICINE
Payer: COMMERCIAL

## 2023-05-22 ENCOUNTER — HOSPITAL ENCOUNTER (EMERGENCY)
Facility: CLINIC | Age: 53
Discharge: HOME | End: 2023-05-22
Attending: EMERGENCY MEDICINE | Admitting: EMERGENCY MEDICINE
Payer: COMMERCIAL

## 2023-05-22 VITALS
HEART RATE: 96 BPM | HEIGHT: 65 IN | BODY MASS INDEX: 28.32 KG/M2 | RESPIRATION RATE: 20 BRPM | SYSTOLIC BLOOD PRESSURE: 152 MMHG | OXYGEN SATURATION: 96 % | WEIGHT: 170 LBS | DIASTOLIC BLOOD PRESSURE: 79 MMHG | TEMPERATURE: 98.1 F

## 2023-05-22 DIAGNOSIS — R05.1 ACUTE COUGH: ICD-10-CM

## 2023-05-22 DIAGNOSIS — J06.9 VIRAL URI: ICD-10-CM

## 2023-05-22 DIAGNOSIS — R06.02 SOB (SHORTNESS OF BREATH): ICD-10-CM

## 2023-05-22 LAB
ALBUMIN SERPL BCG-MCNC: 3.9 G/DL (ref 3.5–5.2)
ALP SERPL-CCNC: 80 U/L (ref 35–104)
ALT SERPL W P-5'-P-CCNC: 25 U/L (ref 10–35)
ANION GAP SERPL CALCULATED.3IONS-SCNC: 10 MMOL/L (ref 7–15)
AST SERPL W P-5'-P-CCNC: 42 U/L (ref 10–35)
BASOPHILS # BLD AUTO: 0.1 10E3/UL (ref 0–0.2)
BASOPHILS NFR BLD AUTO: 1 %
BILIRUB SERPL-MCNC: 0.2 MG/DL
BUN SERPL-MCNC: 10.1 MG/DL (ref 6–20)
CALCIUM SERPL-MCNC: 9.4 MG/DL (ref 8.6–10)
CHLORIDE SERPL-SCNC: 106 MMOL/L (ref 98–107)
CREAT SERPL-MCNC: 0.71 MG/DL (ref 0.51–0.95)
DEPRECATED HCO3 PLAS-SCNC: 25 MMOL/L (ref 22–29)
EOSINOPHIL # BLD AUTO: 0.4 10E3/UL (ref 0–0.7)
EOSINOPHIL NFR BLD AUTO: 5 %
ERYTHROCYTE [DISTWIDTH] IN BLOOD BY AUTOMATED COUNT: 12.9 % (ref 10–15)
FLUAV RNA SPEC QL NAA+PROBE: NEGATIVE
FLUBV RNA RESP QL NAA+PROBE: NEGATIVE
GFR SERPL CREATININE-BSD FRML MDRD: >90 ML/MIN/1.73M2
GLUCOSE SERPL-MCNC: 104 MG/DL (ref 70–99)
HCT VFR BLD AUTO: 37.2 % (ref 35–47)
HGB BLD-MCNC: 12.3 G/DL (ref 11.7–15.7)
HOLD SPECIMEN: NORMAL
HOLD SPECIMEN: NORMAL
IMM GRANULOCYTES # BLD: 0 10E3/UL
IMM GRANULOCYTES NFR BLD: 0 %
LYMPHOCYTES # BLD AUTO: 1.9 10E3/UL (ref 0.8–5.3)
LYMPHOCYTES NFR BLD AUTO: 23 %
MCH RBC QN AUTO: 32 PG (ref 26.5–33)
MCHC RBC AUTO-ENTMCNC: 33.1 G/DL (ref 31.5–36.5)
MCV RBC AUTO: 97 FL (ref 78–100)
MONOCYTES # BLD AUTO: 1.1 10E3/UL (ref 0–1.3)
MONOCYTES NFR BLD AUTO: 13 %
NEUTROPHILS # BLD AUTO: 4.6 10E3/UL (ref 1.6–8.3)
NEUTROPHILS NFR BLD AUTO: 58 %
NRBC # BLD AUTO: 0 10E3/UL
NRBC BLD AUTO-RTO: 0 /100
PLATELET # BLD AUTO: 145 10E3/UL (ref 150–450)
POTASSIUM SERPL-SCNC: 4 MMOL/L (ref 3.4–5.3)
PROT SERPL-MCNC: 7.2 G/DL (ref 6.4–8.3)
RBC # BLD AUTO: 3.84 10E6/UL (ref 3.8–5.2)
RSV RNA SPEC NAA+PROBE: NEGATIVE
SARS-COV-2 RNA RESP QL NAA+PROBE: NEGATIVE
SODIUM SERPL-SCNC: 141 MMOL/L (ref 136–145)
WBC # BLD AUTO: 8.1 10E3/UL (ref 4–11)

## 2023-05-22 PROCEDURE — 99284 EMERGENCY DEPT VISIT MOD MDM: CPT | Performed by: EMERGENCY MEDICINE

## 2023-05-22 PROCEDURE — 85014 HEMATOCRIT: CPT | Performed by: EMERGENCY MEDICINE

## 2023-05-22 PROCEDURE — 87637 SARSCOV2&INF A&B&RSV AMP PRB: CPT | Performed by: EMERGENCY MEDICINE

## 2023-05-22 PROCEDURE — 80053 COMPREHEN METABOLIC PANEL: CPT | Performed by: EMERGENCY MEDICINE

## 2023-05-22 PROCEDURE — C9803 HOPD COVID-19 SPEC COLLECT: HCPCS | Performed by: EMERGENCY MEDICINE

## 2023-05-22 PROCEDURE — 94640 AIRWAY INHALATION TREATMENT: CPT

## 2023-05-22 PROCEDURE — 250N000009 HC RX 250: Performed by: EMERGENCY MEDICINE

## 2023-05-22 PROCEDURE — 999N000157 HC STATISTIC RCP TIME EA 10 MIN

## 2023-05-22 PROCEDURE — 36415 COLL VENOUS BLD VENIPUNCTURE: CPT | Performed by: EMERGENCY MEDICINE

## 2023-05-22 PROCEDURE — 250N000013 HC RX MED GY IP 250 OP 250 PS 637: Performed by: EMERGENCY MEDICINE

## 2023-05-22 PROCEDURE — 99284 EMERGENCY DEPT VISIT MOD MDM: CPT | Mod: 25 | Performed by: EMERGENCY MEDICINE

## 2023-05-22 PROCEDURE — 85025 COMPLETE CBC W/AUTO DIFF WBC: CPT | Performed by: EMERGENCY MEDICINE

## 2023-05-22 PROCEDURE — 250N000012 HC RX MED GY IP 250 OP 636 PS 637: Performed by: EMERGENCY MEDICINE

## 2023-05-22 PROCEDURE — 71046 X-RAY EXAM CHEST 2 VIEWS: CPT

## 2023-05-22 RX ORDER — PREDNISONE 20 MG/1
40 TABLET ORAL ONCE
Status: COMPLETED | OUTPATIENT
Start: 2023-05-22 | End: 2023-05-22

## 2023-05-22 RX ORDER — IPRATROPIUM BROMIDE AND ALBUTEROL SULFATE 2.5; .5 MG/3ML; MG/3ML
3 SOLUTION RESPIRATORY (INHALATION) ONCE
Status: COMPLETED | OUTPATIENT
Start: 2023-05-22 | End: 2023-05-22

## 2023-05-22 RX ORDER — PREDNISONE 20 MG/1
TABLET ORAL
Qty: 10 TABLET | Refills: 0 | Status: ON HOLD | OUTPATIENT
Start: 2023-05-22 | End: 2023-11-30

## 2023-05-22 RX ORDER — CODEINE PHOSPHATE AND GUAIFENESIN 10; 100 MG/5ML; MG/5ML
1-2 SOLUTION ORAL EVERY 4 HOURS PRN
Qty: 120 ML | Refills: 0 | Status: ON HOLD | OUTPATIENT
Start: 2023-05-22 | End: 2023-11-30

## 2023-05-22 RX ORDER — ALBUTEROL SULFATE 90 UG/1
6 AEROSOL, METERED RESPIRATORY (INHALATION) ONCE
Status: COMPLETED | OUTPATIENT
Start: 2023-05-22 | End: 2023-05-22

## 2023-05-22 RX ADMIN — IPRATROPIUM BROMIDE AND ALBUTEROL SULFATE 3 ML: .5; 2.5 SOLUTION RESPIRATORY (INHALATION) at 23:27

## 2023-05-22 RX ADMIN — PREDNISONE 40 MG: 20 TABLET ORAL at 23:40

## 2023-05-22 RX ADMIN — ALBUTEROL SULFATE 6 PUFF: 90 AEROSOL, METERED RESPIRATORY (INHALATION) at 23:41

## 2023-05-22 ASSESSMENT — ACTIVITIES OF DAILY LIVING (ADL): ADLS_ACUITY_SCORE: 35

## 2023-05-23 NOTE — ED TRIAGE NOTES
Cough started yesterday, now more shortness of breath today. Smoker. Had full body aches about a week ago.      Triage Assessment     Row Name 05/22/23 7001       Triage Assessment (Adult)    Airway WDL WDL       Respiratory WDL    Respiratory WDL WDL       Skin Circulation/Temperature WDL    Skin Circulation/Temperature WDL WDL       Cardiac WDL    Cardiac WDL WDL       Peripheral/Neurovascular WDL    Peripheral Neurovascular WDL WDL       Cognitive/Neuro/Behavioral WDL    Cognitive/Neuro/Behavioral WDL WDL

## 2023-05-23 NOTE — ED NOTES
"Patient report severe body aches that started 1 week ago.  SOA and frequent cough started yesterday.  Patient reports \"being a smoker and having a smoker cough, but this is different.\"    "

## 2023-05-23 NOTE — ED PROVIDER NOTES
"  History     Chief Complaint   Patient presents with     Shortness of Breath     Cough started yesterday, now more shortness of breath today. Smoker.      HPI  Gladys Mullins is a 53 year old female who presents to the emergency department with  for concerns regarding cough, in addition to shortness of breath.  Patient has history significant for hypertension, in addition to tobacco use.  Has decreased number of cigarettes recently.  Increased amounts of shortness of breath over approximately the past 1 week, however developed increased amounts of cough starting yesterday, productive of clear sputum.  No fever.  No other ill contacts.  No history of asthma previously.  No inhaler or nebulizer use.  No recent steroid use.  Denies any travel, or lower extremity swelling.  No prior history of blood clots.    Allergies:  Allergies   Allergen Reactions     Codeine Phosphate GI Disturbance       Problem List:    There are no problems to display for this patient.       Past Medical History:    Past Medical History:   Diagnosis Date     Hypertension        Past Surgical History:    No past surgical history on file.    Family History:    No family history on file.    Social History:  Marital Status:   [2]  Social History     Tobacco Use     Smoking status: Every Day     Smokeless tobacco: Never   Substance Use Topics     Alcohol use: Yes     Comment: social     Drug use: No        Medications:    guaiFENesin-codeine (ROBITUSSIN AC) 100-10 MG/5ML solution  predniSONE (DELTASONE) 20 MG tablet  Famotidine (PEPCID PO)  FLUoxetine (PROZAC) 10 MG capsule  HYDROcodone-acetaminophen (NORCO) 5-325 MG tablet  lisinopril-hydrochlorothiazide (PRINZIDE,ZESTORETIC) 20-25 MG per tablet  traZODone (DESYREL) 50 MG tablet          Review of Systems  See HPI  Physical Exam   BP: (!) 173/102  Pulse: 94  Temp: 98.1  F (36.7  C)  Resp: 20  Height: 165.1 cm (5' 5\")  Weight: 77.1 kg (170 lb)  SpO2: 94 %      Physical Exam  BP (!) " "152/79   Pulse 96   Temp 98.1  F (36.7  C) (Tympanic)   Resp 20   Ht 1.651 m (5' 5\")   Wt 77.1 kg (170 lb)   SpO2 96%   BMI 28.29 kg/m    General: alert and in no acute distress  Head: atraumatic, normocephalic  Abd: nondistended  Lungs:   mild diffuse and expiratory wheezing throughout both lung  Musculoskel/Extremities: normal extremities, no apparent edema, and full AROM of major joints  Skin: no rashes, no diaphoresis and skin color normal  Neuro: Patient awake, alert, oriented, speech is fluent, gait is normal  Psychiatric: affect/mood normal, cooperative, normal judgement/insight and memory intact      ED Course                 Procedures              Critical Care time:  none               Results for orders placed or performed during the hospital encounter of 05/22/23 (from the past 24 hour(s))   CBC with platelets, differential    Narrative    The following orders were created for panel order CBC with platelets, differential.  Procedure                               Abnormality         Status                     ---------                               -----------         ------                     CBC with platelets and d...[[id number]]  Abnormal            Final result                 Please view results for these tests on the individual orders.   Comprehensive metabolic panel   Result Value Ref Range    Sodium 141 136 - 145 mmol/L    Potassium 4.0 3.4 - 5.3 mmol/L    Chloride 106 98 - 107 mmol/L    Carbon Dioxide (CO2) 25 22 - 29 mmol/L    Anion Gap 10 7 - 15 mmol/L    Urea Nitrogen 10.1 6.0 - 20.0 mg/dL    Creatinine 0.71 0.51 - 0.95 mg/dL    Calcium 9.4 8.6 - 10.0 mg/dL    Glucose 104 (H) 70 - 99 mg/dL    Alkaline Phosphatase 80 35 - 104 U/L    AST 42 (H) 10 - 35 U/L    ALT 25 10 - 35 U/L    Protein Total 7.2 6.4 - 8.3 g/dL    Albumin 3.9 3.5 - 5.2 g/dL    Bilirubin Total 0.2 <=1.2 mg/dL    GFR Estimate >90 >60 mL/min/1.73m2   [place] Draw    Narrative    The following orders were created for " panel order [place] Draw.  Procedure                               Abnormality         Status                     ---------                               -----------         ------                     Extra Blue Top Tube[[id number]]                              Final result               Extra Red Top Tube[[id number]]                               Final result                 Please view results for these tests on the individual orders.   CBC with platelets and differential   Result Value Ref Range    WBC Count 8.1 4.0 - 11.0 10e3/uL    RBC Count 3.84 3.80 - 5.20 10e6/uL    Hemoglobin 12.3 11.7 - 15.7 g/dL    Hematocrit 37.2 35.0 - 47.0 %    MCV 97 78 - 100 fL    MCH 32.0 26.5 - 33.0 pg    MCHC 33.1 31.5 - 36.5 g/dL    RDW 12.9 10.0 - 15.0 %    Platelet Count 145 (L) 150 - 450 10e3/uL    % Neutrophils 58 %    % Lymphocytes 23 %    % Monocytes 13 %    % Eosinophils 5 %    % Basophils 1 %    % Immature Granulocytes 0 %    NRBCs per 100 WBC 0 <1 /100    Absolute Neutrophils 4.6 1.6 - 8.3 10e3/uL    Absolute Lymphocytes 1.9 0.8 - 5.3 10e3/uL    Absolute Monocytes 1.1 0.0 - 1.3 10e3/uL    Absolute Eosinophils 0.4 0.0 - 0.7 10e3/uL    Absolute Basophils 0.1 0.0 - 0.2 10e3/uL    Absolute Immature Granulocytes 0.0 <=0.4 10e3/uL    Absolute NRBCs 0.0 10e3/uL   Extra Blue Top Tube   Result Value Ref Range    Hold Specimen JIC    Extra Red Top Tube   Result Value Ref Range    Hold Specimen JI    Symptomatic Influenza A/B, RSV, & SARS-CoV2 PCR (COVID-19) Nose    Specimen: Nose; Swab   Result Value Ref Range    Influenza A PCR Negative Negative    Influenza B PCR Negative Negative    RSV PCR Negative Negative    SARS CoV2 PCR Negative Negative    Narrative    Testing was performed using the Xpert Xpress CoV2/Flu/RSV Assay on the [employer] Instrument. This test should be ordered for the detection of SARS-CoV-2, influenza, and RSV viruses in individuals who meet clinical and/or epidemiological criteria. Test performance is  unknown in asymptomatic patients. This test is for in vitro diagnostic use under the FDA EUA for laboratories certified under CLIA to perform high or moderate complexity testing. This test has not been FDA cleared or approved. A negative result does not rule out the presence of PCR inhibitors in the specimen or target RNA in concentration below the limit of detection for the assay. If only one viral target is positive but coinfection with multiple targets is suspected, the sample should be re-tested with another FDA cleared, approved, or authorized test, if coinfection would change clinical management. This test was validated by the [facility]. These laboratories are certified under the Clinical Laboratory Improvement Amendments of 1988 (CLIA-88) as qualified to perform high complexity laboratory testing.   Chest XR,  PA & LAT    Narrative    EXAM: XR CHEST 2 VIEWS  LOCATION: [facility]  DATE/TIME: 5/22/2023 11:12 PM CDT    INDICATION: cough.  sob  COMPARISON: None.      Impression    IMPRESSION: Negative chest.       Medications   ipratropium - albuterol 0.5 mg/2.5 mg/3 mL (DUONEB) neb solution 3 mL (3 mLs Nebulization $Given 5/22/23 6491)   albuterol (PROVENTIL HFA/VENTOLIN HFA) inhaler (6 puffs Inhalation $Given 5/22/23 5031)   predniSONE (DELTASONE) tablet 40 mg (40 mg Oral $Given 5/22/23 2340)       Assessments & Plan (with Medical Decision Making)  53 year old female presenting to the emergency department with concerns regarding cough, shortness of breath.  Has had body aches starting 1 week ago.  Cough worsening yesterday.  Patient with laboratory work-up that is performed that shows no significant laboratory abnormalities.  Chest x-ray is personally reviewed and my interpretation shows no evidence of pneumonia.  Radiology review shows negative x-ray.  COVID, influenza, and RSV testing are negative.  Patient given duo nebulizer, and did feel improved.  She will  be sent home with albuterol inhaler, steroids, and guaifenesin with codeine.  Return instructions discussed if new or worsening symptoms develop.  Do not feel that blood clot is likely.     I have reviewed the nursing notes.    I have reviewed the findings, diagnosis, plan and need for follow up with the patient.               Discharge Medication List as of 5/22/2023 11:46 PM      START taking these medications    Details   guaiFENesin-codeine (ROBITUSSIN AC) 100-10 MG/5ML solution Take 5-10 mLs by mouth every 4 hours as needed for cough, Disp-120 mL, R-0, [facility]      predniSONE (DELTASONE) 20 MG tablet Take two tablets (= 40mg) each day for 5 (five) days, Disp-10 tablet, R-0, E-Prescribe             Final diagnoses:   Acute cough   Viral URI   SOB (shortness of breath)       5/22/2023   [facility] EMERGENCY DEPT     Eliseo Correia MD  05/23/23 0003

## 2023-05-23 NOTE — ED TRIAGE NOTES
Triage Assessment     Row Name 05/22/23 8251       Triage Assessment (Adult)    Airway WDL WDL       Respiratory WDL    Respiratory WDL WDL       Skin Circulation/Temperature WDL    Skin Circulation/Temperature WDL WDL       Cardiac WDL    Cardiac WDL WDL       Peripheral/Neurovascular WDL    Peripheral Neurovascular WDL WDL       Cognitive/Neuro/Behavioral WDL    Cognitive/Neuro/Behavioral WDL WDL

## 2023-11-24 NOTE — H&P (VIEW-ONLY)
Nursing Notes:   Anny Clark  11/24/2023  4:02 PM  Sign at exiting of workspace  Gladys Mullins is a 53 y.o. female (1970) who presents for preop evaluation undergoing POSTERIOR VAGINAL REPAIR, PARAVAGINAL REPAIR     Date of Surgery: November 30th, 2023  Surgical Specialty: OBGYN  Dr Carmona  [facility] Facility: [facility]  Fax number: [phone] outpatient  Primary Physician: Semmler, Steven Duane    Chief Complaint   Patient presents with     Preoperative Exam       Additional visit information (chief complaint/health maintenance) shared by patient: n/a  Health Maintenance Due   Topic Date Due     Pneumococcal series for age 6-64 (1 - PCV) Never done     HIV for age 15-65  Never done     Fecal testing non-DNA (FIT,FOBT,iFOBT) for age 45-75  Never done     Low Dose CT (for lung CA) age 50-80  Never done     Zoster (shingles) series for age 50+ (2 of 2) 01/30/2023     Influenza for age 50-64  09/01/2023     COVID-19 vaccine series (6 - 2023-24 season) 09/01/2023       Health maintenance reviewed with patient Yes    Patient presents for an in-person office visit: alone  Communication Method: Patient is active on [employer] and has been instructed that results/communications will be made via [employer]  If a phone call is needed, the preferred number is: Mobile   Home Phone [phone]   Mobile [phone]     May we leave a detailed message at this number? Yes    Anny Clark  CMA...................11/24/2023 4:02 PM                HPI:  Hx bladder issues and rectocele planning ant/post repair    Problem List:   Patient Active Problem List   Diagnosis Code     Tobacco abuse Z72.0     Routine general medical examination at a health care facility Z00.00     HTN (hypertension) I10     Mixed hyperlipidemia E78.2     JAILENE I (cervical intraepithelial neoplasia I) N87.0        Histories:  Past Medical History:   . Date     Bronchitis      Hypertension        Past Surgical  History:   . Laterality Date     ESSURE  3-15-10     hx plastic surgery on face  1-2011       Social History     Tobacco Use   Smoking Status Every Day     Current packs/day: 0.50     Average packs/day: 1 pack/day for 26.4 years (25.2 ttl pk-yrs)     Types: Cigarettes     Start date: 4/19/1997     Last attempt to quit: 4/19/2021   Smokeless Tobacco Never   Tobacco Comments    AVERAGING 1/2 PPD       Family History   Problem Relation Age of Onset     Stroke Maternal Grandfather      Hypertension Mother      Hypertension Father         CAD     Other Father         kidney disease     Cancer-breast No Family History      Cancer-ovarian No Family History         Allergies:   Allergies   Allergen Reactions     Codeine Nausea Only     Latex Allergy: no    Current Outpatient Rx   Medication Sig Dispense Refill     estradiol 0.025 mg/24 hr SEMIWEEKLY patch        lisinopril-hydrochlorothiazide 20-12.5 mg tablet (PRINZIDE) Take 1 Tablet by mouth once daily. 90 Tablet 3     omeprazole (PRILOSEC) 40 mg Delayed-Release capsule Take 1 Capsule (40 mg) by mouth once daily before a meal. 90 Capsule 3     progesterone micronized (PROMETRIUM) 100 mg capsule Take 100 mg by mouth at bedtime.       rosuvastatin (CRESTOR) 5 mg tablet Take 1 Tablet (5 mg) by mouth at bedtime. 90 Tablet 3     traZODone (DESYREL) 50 mg tablet TAKE 1 TABLET BY MOUTH AT BEDTIME 30 Tablet 0     venlafaxine (EFFEXOR XR) 75 mg cp24 Extended-Release capsule Take 1 Capsule (75 mg) by mouth once daily with a meal. 90 Capsule 3     Medications have been reviewed by me and are current to the best of my knowledge and ability.       Recent use of: no recent use of aspirin (ASA), NSAIDS or steroids    Anesthesia Complications: None  History of abnormal bleeding : None  History of Blood Transfusions: No    Health Care Directive or Living Will: no    REVIEW OF SYSTEMS:  Do you feel unwell? no  Do you get any more short of breath on exertion than other people of your age?  "No  Do you have any coughing? No  Do you have any wheezing?  No  Do you have any chest pain on exertion (anginal type)? No  Do you have any change in ankle swelling? No  Have you had an anesthetic in the last two months? No  Remainder of systems were reviewed and were negative.      EXAM:   /60 (Cuff Site: Right Arm, Position: Sitting, Cuff Size: Adult Large)   Pulse 88   Ht 1.632 m (5' 4.25\")   Wt 82.1 kg (181 lb)   BMI 30.83 kg/m     General Appearance: No distress - comfortable  Head Exam: Normocephalic, without obvious abnormality.  Eye Exam: Normal external eye, conjunctiva, lids, cornea.  Ear Exam:  Normal auditory canals and external ears.   Nose Exam: Normal external nose  OroPharynx Exam: Dental hygiene adequate. Normal buccal mucosa. Normal pharynx.  Neck Exam: Supple  Chest/Respiratory Exam: Normal chest wall and respirations. Clear to auscultation.  Cardiovascular Exam: Regular rate and rhythm. S1, S2, no murmur, click, gallop, or rubs.  Gastrointestinal Exam: Soft, nontender  Neurologic Exam: Nonfocal;, normal gross motor movement, tone, and coordination. No tremor.  Psychiatric Exam: Alert and oriented, appropriate affect.        Labs: yes  ECG: no    DIAGNOSTICS:   1. EKG: EKG FINDINGS - not indicated  2. CXR: not incidated  3. Labs: pending    IMPRESSION:   Preop exam for     ICD-10-CM    1. Preoperative cardiovascular examination  Z01.810 BASIC METABOLIC PANEL     CBC W PLT NO DIFF             For above listed surgery and anesthesia:   Patient is low risk for perioperative complications.    RECOMMENDATIONS: proceed without further diagnostic evaluation  Discussed holding lisinopril/HCTZ AM of procedure      Jose Manuel Juarez MD 11/24/2023 4:04 PM      "

## 2023-11-29 RX ORDER — PROGESTERONE 100 MG/1
100 CAPSULE ORAL AT BEDTIME
COMMUNITY

## 2023-11-29 RX ORDER — ROSUVASTATIN CALCIUM 5 MG/1
5 TABLET, COATED ORAL AT BEDTIME
COMMUNITY

## 2023-11-29 RX ORDER — ESTRADIOL 0.03 MG/D
1 FILM, EXTENDED RELEASE TRANSDERMAL
COMMUNITY

## 2023-11-29 RX ORDER — OMEPRAZOLE 40 MG/1
40 CAPSULE, DELAYED RELEASE ORAL DAILY
COMMUNITY

## 2023-11-29 RX ORDER — VENLAFAXINE HYDROCHLORIDE 75 MG/1
75 CAPSULE, EXTENDED RELEASE ORAL DAILY
COMMUNITY

## 2023-11-29 RX ORDER — LISINOPRIL AND HYDROCHLOROTHIAZIDE 12.5; 2 MG/1; MG/1
1 TABLET ORAL DAILY
COMMUNITY

## 2023-11-30 ENCOUNTER — ANESTHESIA (OUTPATIENT)
Dept: SURGERY | Facility: HOSPITAL | Age: 53
End: 2023-11-30
Payer: COMMERCIAL

## 2023-11-30 ENCOUNTER — HOSPITAL ENCOUNTER (OUTPATIENT)
Facility: HOSPITAL | Age: 53
Discharge: HOME | End: 2023-11-30
Attending: OBSTETRICS & GYNECOLOGY | Admitting: OBSTETRICS & GYNECOLOGY
Payer: COMMERCIAL

## 2023-11-30 ENCOUNTER — ANESTHESIA EVENT (OUTPATIENT)
Dept: SURGERY | Facility: HOSPITAL | Age: 53
End: 2023-11-30
Payer: COMMERCIAL

## 2023-11-30 VITALS
HEART RATE: 71 BPM | RESPIRATION RATE: 16 BRPM | DIASTOLIC BLOOD PRESSURE: 68 MMHG | HEIGHT: 65 IN | BODY MASS INDEX: 29.79 KG/M2 | WEIGHT: 178.8 LBS | TEMPERATURE: 98.6 F | SYSTOLIC BLOOD PRESSURE: 119 MMHG | OXYGEN SATURATION: 98 %

## 2023-11-30 DIAGNOSIS — Z09 S/P GYNECOLOGICAL SURGERY, FOLLOW-UP EXAM: Primary | ICD-10-CM

## 2023-11-30 LAB — HCG INTACT+B SERPL-ACNC: 4 MIU/ML

## 2023-11-30 PROCEDURE — 250N000009 HC RX 250: Performed by: ANESTHESIOLOGY

## 2023-11-30 PROCEDURE — 250N000009 HC RX 250: Performed by: NURSE ANESTHETIST, CERTIFIED REGISTERED

## 2023-11-30 PROCEDURE — 272N000001 HC OR GENERAL SUPPLY STERILE: Performed by: OBSTETRICS & GYNECOLOGY

## 2023-11-30 PROCEDURE — 370N000017 HC ANESTHESIA TECHNICAL FEE, PER MIN: Performed by: OBSTETRICS & GYNECOLOGY

## 2023-11-30 PROCEDURE — 250N000013 HC RX MED GY IP 250 OP 250 PS 637: Performed by: ANESTHESIOLOGY

## 2023-11-30 PROCEDURE — 36415 COLL VENOUS BLD VENIPUNCTURE: CPT | Performed by: NURSE PRACTITIONER

## 2023-11-30 PROCEDURE — 360N000076 HC SURGERY LEVEL 3, PER MIN: Performed by: OBSTETRICS & GYNECOLOGY

## 2023-11-30 PROCEDURE — 258N000003 HC RX IP 258 OP 636: Performed by: ANESTHESIOLOGY

## 2023-11-30 PROCEDURE — 250N000025 HC SEVOFLURANE, PER MIN: Performed by: OBSTETRICS & GYNECOLOGY

## 2023-11-30 PROCEDURE — C1771 REP DEV, URINARY, W/SLING: HCPCS | Performed by: OBSTETRICS & GYNECOLOGY

## 2023-11-30 PROCEDURE — 250N000011 HC RX IP 250 OP 636: Performed by: NURSE PRACTITIONER

## 2023-11-30 PROCEDURE — 710N000012 HC RECOVERY PHASE 2, PER MINUTE: Performed by: OBSTETRICS & GYNECOLOGY

## 2023-11-30 PROCEDURE — 250N000011 HC RX IP 250 OP 636: Performed by: OBSTETRICS & GYNECOLOGY

## 2023-11-30 PROCEDURE — 250N000011 HC RX IP 250 OP 636: Mod: JZ | Performed by: NURSE ANESTHETIST, CERTIFIED REGISTERED

## 2023-11-30 PROCEDURE — 250N000011 HC RX IP 250 OP 636: Performed by: ANESTHESIOLOGY

## 2023-11-30 PROCEDURE — 710N000010 HC RECOVERY PHASE 1, LEVEL 2, PER MIN: Performed by: OBSTETRICS & GYNECOLOGY

## 2023-11-30 PROCEDURE — 84702 CHORIONIC GONADOTROPIN TEST: CPT | Performed by: NURSE PRACTITIONER

## 2023-11-30 PROCEDURE — 250N000009 HC RX 250: Performed by: OBSTETRICS & GYNECOLOGY

## 2023-11-30 PROCEDURE — 999N000141 HC STATISTIC PRE-PROCEDURE NURSING ASSESSMENT: Performed by: OBSTETRICS & GYNECOLOGY

## 2023-11-30 PROCEDURE — 250N000011 HC RX IP 250 OP 636: Mod: JZ | Performed by: ANESTHESIOLOGY

## 2023-11-30 PROCEDURE — 250N000013 HC RX MED GY IP 250 OP 250 PS 637: Performed by: NURSE PRACTITIONER

## 2023-11-30 DEVICE — MESH SLING SINGLE INCISION ALTIS 519650: Type: IMPLANTABLE DEVICE | Site: BLADDER | Status: FUNCTIONAL

## 2023-11-30 RX ORDER — SODIUM CHLORIDE, SODIUM LACTATE, POTASSIUM CHLORIDE, CALCIUM CHLORIDE 600; 310; 30; 20 MG/100ML; MG/100ML; MG/100ML; MG/100ML
INJECTION, SOLUTION INTRAVENOUS CONTINUOUS
Status: DISCONTINUED | OUTPATIENT
Start: 2023-11-30 | End: 2023-11-30 | Stop reason: HOSPADM

## 2023-11-30 RX ORDER — PROPOFOL 10 MG/ML
INJECTION, EMULSION INTRAVENOUS CONTINUOUS PRN
Status: DISCONTINUED | OUTPATIENT
Start: 2023-11-30 | End: 2023-11-30

## 2023-11-30 RX ORDER — HYDROMORPHONE HCL IN WATER/PF 6 MG/30 ML
0.2 PATIENT CONTROLLED ANALGESIA SYRINGE INTRAVENOUS EVERY 5 MIN PRN
Status: DISCONTINUED | OUTPATIENT
Start: 2023-11-30 | End: 2023-11-30 | Stop reason: HOSPADM

## 2023-11-30 RX ORDER — ONDANSETRON 4 MG/1
4 TABLET, ORALLY DISINTEGRATING ORAL EVERY 8 HOURS PRN
Qty: 20 TABLET | Refills: 0 | Status: SHIPPED | OUTPATIENT
Start: 2023-11-30

## 2023-11-30 RX ORDER — OXYCODONE HYDROCHLORIDE 5 MG/1
5-10 TABLET ORAL EVERY 4 HOURS PRN
Qty: 20 TABLET | Refills: 0 | Status: SHIPPED | OUTPATIENT
Start: 2023-11-30

## 2023-11-30 RX ORDER — CEFAZOLIN SODIUM/WATER 2 G/20 ML
2 SYRINGE (ML) INTRAVENOUS SEE ADMIN INSTRUCTIONS
Status: DISCONTINUED | OUTPATIENT
Start: 2023-11-30 | End: 2023-11-30 | Stop reason: HOSPADM

## 2023-11-30 RX ORDER — ACETAMINOPHEN 325 MG/1
975 TABLET ORAL ONCE
Status: COMPLETED | OUTPATIENT
Start: 2023-11-30 | End: 2023-11-30

## 2023-11-30 RX ORDER — NALOXONE HYDROCHLORIDE 0.4 MG/ML
0.4 INJECTION, SOLUTION INTRAMUSCULAR; INTRAVENOUS; SUBCUTANEOUS
Status: DISCONTINUED | OUTPATIENT
Start: 2023-11-30 | End: 2023-11-30 | Stop reason: HOSPADM

## 2023-11-30 RX ORDER — DEXAMETHASONE SODIUM PHOSPHATE 10 MG/ML
INJECTION, SOLUTION INTRAMUSCULAR; INTRAVENOUS PRN
Status: DISCONTINUED | OUTPATIENT
Start: 2023-11-30 | End: 2023-11-30

## 2023-11-30 RX ORDER — LABETALOL HYDROCHLORIDE 5 MG/ML
5 INJECTION, SOLUTION INTRAVENOUS EVERY 10 MIN PRN
Status: DISCONTINUED | OUTPATIENT
Start: 2023-11-30 | End: 2023-11-30 | Stop reason: HOSPADM

## 2023-11-30 RX ORDER — ONDANSETRON 2 MG/ML
4 INJECTION INTRAMUSCULAR; INTRAVENOUS EVERY 30 MIN PRN
Status: DISCONTINUED | OUTPATIENT
Start: 2023-11-30 | End: 2023-11-30 | Stop reason: HOSPADM

## 2023-11-30 RX ORDER — LIDOCAINE 40 MG/G
CREAM TOPICAL
Status: DISCONTINUED | OUTPATIENT
Start: 2023-11-30 | End: 2023-11-30 | Stop reason: HOSPADM

## 2023-11-30 RX ORDER — FENTANYL CITRATE 50 UG/ML
25 INJECTION, SOLUTION INTRAMUSCULAR; INTRAVENOUS EVERY 5 MIN PRN
Status: DISCONTINUED | OUTPATIENT
Start: 2023-11-30 | End: 2023-11-30 | Stop reason: HOSPADM

## 2023-11-30 RX ORDER — NALOXONE HYDROCHLORIDE 0.4 MG/ML
0.2 INJECTION, SOLUTION INTRAMUSCULAR; INTRAVENOUS; SUBCUTANEOUS
Status: DISCONTINUED | OUTPATIENT
Start: 2023-11-30 | End: 2023-11-30 | Stop reason: HOSPADM

## 2023-11-30 RX ORDER — FENTANYL CITRATE 50 UG/ML
25 INJECTION, SOLUTION INTRAMUSCULAR; INTRAVENOUS
Status: DISCONTINUED | OUTPATIENT
Start: 2023-11-30 | End: 2023-11-30 | Stop reason: HOSPADM

## 2023-11-30 RX ORDER — HYDROMORPHONE HCL IN WATER/PF 6 MG/30 ML
0.4 PATIENT CONTROLLED ANALGESIA SYRINGE INTRAVENOUS EVERY 5 MIN PRN
Status: DISCONTINUED | OUTPATIENT
Start: 2023-11-30 | End: 2023-11-30 | Stop reason: HOSPADM

## 2023-11-30 RX ORDER — EPHEDRINE SULFATE 50 MG/ML
INJECTION, SOLUTION INTRAMUSCULAR; INTRAVENOUS; SUBCUTANEOUS PRN
Status: DISCONTINUED | OUTPATIENT
Start: 2023-11-30 | End: 2023-11-30

## 2023-11-30 RX ORDER — MAGNESIUM SULFATE 4 G/50ML
4 INJECTION INTRAVENOUS ONCE
Status: COMPLETED | OUTPATIENT
Start: 2023-11-30 | End: 2023-11-30

## 2023-11-30 RX ORDER — IBUPROFEN 800 MG/1
800 TABLET, FILM COATED ORAL EVERY 6 HOURS PRN
Qty: 30 TABLET | Refills: 0 | Status: SHIPPED | OUTPATIENT
Start: 2023-11-30

## 2023-11-30 RX ORDER — AMOXICILLIN 250 MG
1-2 CAPSULE ORAL 2 TIMES DAILY
Qty: 30 TABLET | Refills: 0 | Status: SHIPPED | OUTPATIENT
Start: 2023-11-30

## 2023-11-30 RX ORDER — ACETAMINOPHEN 325 MG/1
975 TABLET ORAL ONCE
Status: DISCONTINUED | OUTPATIENT
Start: 2023-11-30 | End: 2023-11-30

## 2023-11-30 RX ORDER — ACETAMINOPHEN 325 MG/1
975 TABLET ORAL ONCE
Qty: 3 TABLET | Refills: 0 | Status: DISCONTINUED | OUTPATIENT
Start: 2023-11-30 | End: 2023-11-30 | Stop reason: HOSPADM

## 2023-11-30 RX ORDER — OXYCODONE HYDROCHLORIDE 5 MG/1
5 TABLET ORAL
Status: DISCONTINUED | OUTPATIENT
Start: 2023-11-30 | End: 2023-11-30 | Stop reason: HOSPADM

## 2023-11-30 RX ORDER — OXYCODONE HYDROCHLORIDE 5 MG/1
10 TABLET ORAL
Status: COMPLETED | OUTPATIENT
Start: 2023-11-30 | End: 2023-11-30

## 2023-11-30 RX ORDER — FENTANYL CITRATE 50 UG/ML
INJECTION, SOLUTION INTRAMUSCULAR; INTRAVENOUS PRN
Status: DISCONTINUED | OUTPATIENT
Start: 2023-11-30 | End: 2023-11-30

## 2023-11-30 RX ORDER — LIDOCAINE HYDROCHLORIDE 10 MG/ML
INJECTION, SOLUTION INFILTRATION; PERINEURAL PRN
Status: DISCONTINUED | OUTPATIENT
Start: 2023-11-30 | End: 2023-11-30

## 2023-11-30 RX ORDER — ONDANSETRON 4 MG/1
4 TABLET, ORALLY DISINTEGRATING ORAL EVERY 30 MIN PRN
Status: DISCONTINUED | OUTPATIENT
Start: 2023-11-30 | End: 2023-11-30 | Stop reason: HOSPADM

## 2023-11-30 RX ORDER — IBUPROFEN 200 MG
800 TABLET ORAL ONCE
Qty: 4 TABLET | Refills: 0 | Status: DISCONTINUED | OUTPATIENT
Start: 2023-11-30 | End: 2023-11-30 | Stop reason: HOSPADM

## 2023-11-30 RX ORDER — CEFAZOLIN SODIUM/WATER 2 G/20 ML
2 SYRINGE (ML) INTRAVENOUS
Status: COMPLETED | OUTPATIENT
Start: 2023-11-30 | End: 2023-11-30

## 2023-11-30 RX ORDER — ONDANSETRON 2 MG/ML
INJECTION INTRAMUSCULAR; INTRAVENOUS PRN
Status: DISCONTINUED | OUTPATIENT
Start: 2023-11-30 | End: 2023-11-30

## 2023-11-30 RX ORDER — PROPOFOL 10 MG/ML
INJECTION, EMULSION INTRAVENOUS PRN
Status: DISCONTINUED | OUTPATIENT
Start: 2023-11-30 | End: 2023-11-30

## 2023-11-30 RX ORDER — UBIDECARENONE 100 MG
100 CAPSULE ORAL DAILY
COMMUNITY

## 2023-11-30 RX ORDER — HALOPERIDOL 5 MG/ML
1 INJECTION INTRAMUSCULAR
Status: DISCONTINUED | OUTPATIENT
Start: 2023-11-30 | End: 2023-11-30 | Stop reason: HOSPADM

## 2023-11-30 RX ADMIN — FENTANYL CITRATE 25 MCG: 50 INJECTION, SOLUTION INTRAMUSCULAR; INTRAVENOUS at 12:26

## 2023-11-30 RX ADMIN — FENTANYL CITRATE 25 MCG: 50 INJECTION, SOLUTION INTRAMUSCULAR; INTRAVENOUS at 12:11

## 2023-11-30 RX ADMIN — SODIUM CHLORIDE, POTASSIUM CHLORIDE, SODIUM LACTATE AND CALCIUM CHLORIDE: 600; 310; 30; 20 INJECTION, SOLUTION INTRAVENOUS at 08:57

## 2023-11-30 RX ADMIN — OXYCODONE HYDROCHLORIDE 10 MG: 5 TABLET ORAL at 12:13

## 2023-11-30 RX ADMIN — PROPOFOL 120 MG: 10 INJECTION, EMULSION INTRAVENOUS at 09:39

## 2023-11-30 RX ADMIN — Medication 12 MCG: at 11:01

## 2023-11-30 RX ADMIN — SUGAMMADEX 200 MG: 100 INJECTION, SOLUTION INTRAVENOUS at 11:00

## 2023-11-30 RX ADMIN — DEXAMETHASONE SODIUM PHOSPHATE 10 MG: 10 INJECTION, SOLUTION INTRAMUSCULAR; INTRAVENOUS at 09:38

## 2023-11-30 RX ADMIN — Medication 8 MCG: at 10:06

## 2023-11-30 RX ADMIN — MIDAZOLAM 2 MG: 1 INJECTION INTRAMUSCULAR; INTRAVENOUS at 09:32

## 2023-11-30 RX ADMIN — ROCURONIUM BROMIDE 50 MG: 50 INJECTION, SOLUTION INTRAVENOUS at 09:45

## 2023-11-30 RX ADMIN — Medication 8 MCG: at 10:46

## 2023-11-30 RX ADMIN — ONDANSETRON 4 MG: 2 INJECTION INTRAMUSCULAR; INTRAVENOUS at 10:46

## 2023-11-30 RX ADMIN — ONDANSETRON 4 MG: 2 INJECTION INTRAMUSCULAR; INTRAVENOUS at 12:11

## 2023-11-30 RX ADMIN — ACETAMINOPHEN 975 MG: 325 TABLET ORAL at 08:55

## 2023-11-30 RX ADMIN — ROCURONIUM BROMIDE 50 MG: 50 INJECTION, SOLUTION INTRAVENOUS at 09:38

## 2023-11-30 RX ADMIN — LIDOCAINE HYDROCHLORIDE 5 ML: 10 INJECTION, SOLUTION INFILTRATION; PERINEURAL at 09:38

## 2023-11-30 RX ADMIN — FENTANYL CITRATE 150 MCG: 50 INJECTION INTRAMUSCULAR; INTRAVENOUS at 09:38

## 2023-11-30 RX ADMIN — MAGNESIUM SULFATE HEPTAHYDRATE 4 G: 80 INJECTION, SOLUTION INTRAVENOUS at 08:56

## 2023-11-30 RX ADMIN — Medication 5 MG: at 10:41

## 2023-11-30 RX ADMIN — PHENYLEPHRINE HYDROCHLORIDE 100 MCG: 10 INJECTION INTRAVENOUS at 10:41

## 2023-11-30 RX ADMIN — Medication 2 G: at 09:35

## 2023-11-30 RX ADMIN — PHENYLEPHRINE HYDROCHLORIDE 100 MCG: 10 INJECTION INTRAVENOUS at 10:15

## 2023-11-30 RX ADMIN — FENTANYL CITRATE 50 MCG: 50 INJECTION INTRAMUSCULAR; INTRAVENOUS at 11:07

## 2023-11-30 RX ADMIN — PROPOFOL 30 MCG/KG/MIN: 10 INJECTION, EMULSION INTRAVENOUS at 09:45

## 2023-11-30 ASSESSMENT — ACTIVITIES OF DAILY LIVING (ADL)
ADLS_ACUITY_SCORE: 39
ADLS_ACUITY_SCORE: 35
ADLS_ACUITY_SCORE: 35

## 2023-11-30 ASSESSMENT — LIFESTYLE VARIABLES: TOBACCO_USE: 1

## 2023-11-30 NOTE — OP NOTE
Procedure Date:      PREOPERATIVE DIAGNOSES:       1.  Stage II pelvic organ prolapse.  2.  Stress urinary incontinence.     POSTOPERATIVE DIAGNOSES:       1.  Stage IIpelvic organ prolapse.  2.  Stress urinary incontinence.     PROCEDURES:       1.  Posterior vaginal repair.  2.  Paravaginal repair.  3.  Pubovaginal sling with Altis     SURGEON:  Barrera Carmona MD  INDICATIONS:  The patient with progressively worsening prolapse and incontinence, desires surgery.     DESCRIPTION OF THE OPERATIVE PROCEDURE:  After assuring informed consent, the patient was taken to the operating room.  She was prepped and draped in the usual sterile manner.  The patient was placed and positioned by me, ensuring there was no excess flexion of the hips or the knees.  At this point, a yann configuration was removed from the perineum.  The vaginal mucosa was then incised to the apex of the rectocele.  The vaginal mucosa and the rectovaginal septum were split from  the perineal body to the apex of the vagina.   At this point the rectavaginal septum was attached to the pelvic side wall at the apex of the vagina.   The rectovaginal septum was then imbricated in the midline from the apex of the vagina to the perineal body.    The vaginal mucosa was then closed.  The perineum was repaired.  Attention was directed to the pubovaginal sling where a small incision was made in the suburethral area.  The vaginal mucosa and the pubocervical fascia were dissected from the bladder to the level of the pubic rami.  At this point, a Altis needle was placed on the right and left, the sling was then tensioned in response to Valsalva.  It was placed in a tension-free position.  At this point, the vaginal mucosa was closed.  Cystoscopy showed no bladder damage and bilateral ureteral efflux.  This concluded the procedure.  Instruments, needle, and sponge were correct x2.     ESTIMATED BLOOD LOSS:  100 mL.     COMPLICATIONS:  None.     PATHOLOGY:  None.      DRAINS:  Carr to gravity.     DISPOSITION:  The patient to recovery room in stable condition.     Barrera Carmona MD

## 2023-11-30 NOTE — ANESTHESIA PROCEDURE NOTES
Airway       Patient location during procedure: OR       Procedure Start/Stop Times: 11/30/2023 9:42 AM  Staff -        CRNA: Nilam Tijerina APRN CRNA       Performed By: CRNAIndications and Patient Condition       Indications for airway management: phong-procedural       Induction type:intravenous       Mask difficulty assessment: 0 - not attempted    Final Airway Details       Final airway type: endotracheal airway       Successful airway: ETT - single  Endotracheal Airway Details        ETT size (mm): 7.0       Cuffed: yes       Successful intubation technique: direct laryngoscopy       DL Blade Type: MAC 3       Grade View of Cords: 1       Adjucts: stylet       Position: Right       Measured from: gums/teeth       Secured at (cm): 21       Bite block used: None    Post intubation assessment        Placement verified by: capnometry, equal breath sounds and chest rise        Number of attempts at approach: 1       Number of other approaches attempted: 0       Secured with: tape       Ease of procedure: easy       Dentition: Intact and Unchanged       Dental guard used and removed. Dental Guard Type: Standard White.    Medication(s) Administered   Medication Administration Time: 11/30/2023 9:42 AM

## 2023-11-30 NOTE — OR NURSING
Patient arrived to Phase II with 1 strip of vaginal packing in place and Richardson in place.    Removed 1 strip of vaginal packing per order.    Called into OR  to ask Dr. Carmona if Richardson should be removed?    Per Dr. Carmona, ok to remove richardson. Patient must void before discharge.    Patient requesting to speak with Dr. Carmona for an update about what was done in the procedure. Notified Dr. Carmona. He will speak with the patient .

## 2023-11-30 NOTE — INTERVAL H&P NOTE
"I have reviewed the surgical (or preoperative) H&P that is linked to this encounter, and examined the patient. There are no significant changes    Clinical Conditions Present on Arrival:  Clinically Significant Risk Factors Present on Admission                  # Overweight: Estimated body mass index is 29.75 kg/m  as calculated from the following:    Height as of this encounter: 1.651 m (5' 5\").    Weight as of this encounter: 81.1 kg (178 lb 12.8 oz).       "

## 2023-11-30 NOTE — ANESTHESIA CARE TRANSFER NOTE
Patient: Gladys Mullins    Procedure: Procedure(s):  POSTERIOR VAGINAL REPAIR, PARAVAGINAL REPAIR  PUBOVAGINAL SLING AND       Diagnosis: Rectocele [N81.6]  Mixed incontinence urge and stress [N39.46]  Diagnosis Additional Information: No value filed.    Anesthesia Type:   General     Note:    Oropharynx: oropharynx clear of all foreign objects and spontaneously breathing  Level of Consciousness: awake  Oxygen Supplementation: face mask  Level of Supplemental Oxygen (L/min / FiO2): 6  Independent Airway: airway patency satisfactory and stable  Dentition: dentition unchanged  Vital Signs Stable: post-procedure vital signs reviewed and stable  Report to RN Given: handoff report given  Patient transferred to: PACU    Handoff Report: Identifed the Patient, Identified the Reponsible Provider, Reviewed the pertinent medical history, Discussed the surgical course, Reviewed Intra-OP anesthesia mangement and issues during anesthesia, Set expectations for post-procedure period and Allowed opportunity for questions and acknowledgement of understanding      Vitals:  Vitals Value Taken Time   BP 95/46 11/30/23 1111   Temp 37.2  C (98.9  F) 11/30/23 1110   Pulse 70 11/30/23 1112   Resp 19 11/30/23 1112   SpO2 99 % 11/30/23 1112   Vitals shown include unfiled device data.    Electronically Signed By: SIN Stokes CRNA  November 30, 2023  11:14 AM

## 2023-11-30 NOTE — ANESTHESIA POSTPROCEDURE EVALUATION
Patient: Gladys Mullins    Procedure: Procedure(s):  POSTERIOR VAGINAL REPAIR, PARAVAGINAL REPAIR  PUBOVAGINAL SLING AND       Anesthesia Type:  General    Note:  Disposition: Outpatient   Postop Pain Control: Uneventful            Sign Out: Well controlled pain   PONV:    Neuro/Psych: Uneventful            Sign Out: Acceptable/Baseline neuro status (A&O)   Airway/Respiratory: Uneventful            Sign Out: Acceptable/Baseline resp. status   CV/Hemodynamics: Uneventful            Sign Out: Acceptable CV status; No obvious hypovolemia; No obvious fluid overload   Other NRE: NONE   DID A NON-ROUTINE EVENT OCCUR? No           Last vitals:  Vitals Value Taken Time   BP 93/52 11/30/23 1145   Temp 37.2  C (98.9  F) 11/30/23 1110   Pulse 68 11/30/23 1153   Resp 19 11/30/23 1153   SpO2 97 % 11/30/23 1153   Vitals shown include unfiled device data.    Electronically Signed By: Constance Ng MD  November 30, 2023  3:41 PM

## 2023-11-30 NOTE — ANESTHESIA PREPROCEDURE EVALUATION
Anesthesia Pre-Procedure Evaluation    Patient: Gladys Mullins   MRN: [id number] : 1970        Procedure : Procedure(s):  POSTERIOR VAGINAL REPAIR, PARAVAGINAL REPAIR  PUBOVAGINAL SLING AND  POSSIBLE SACROSPINOUS LIGAMENT FIXATION          Past Medical History:   Diagnosis Date    Bronchitis     Cervical intraepithelial neoplasia grade 1     Hypertension     Mixed hyperlipidemia     Rectocele     Tobacco abuse       Past Surgical History:   Procedure Laterality Date    COSMETIC SURGERY      on face    ESSURE TUBAL LIGATION        Allergies   Allergen Reactions    Codeine Phosphate GI Disturbance      Social History     Tobacco Use    Smoking status: Every Day    Smokeless tobacco: Never   Substance Use Topics    Alcohol use: Yes     Comment: social      Wt Readings from Last 1 Encounters:   23 81.1 kg (178 lb 12.8 oz)        Anesthesia Evaluation            ROS/MED HX  ENT/Pulmonary:     (+)     ASHLEIGH risk factors,           tobacco use, Current use,                      Neurologic:  - neg neurologic ROS     Cardiovascular:     (+) Dyslipidemia hypertension- -   -  - -                                   Irregular Heartbeat/Palpitations: PVCs in hx.   METS/Exercise Tolerance: >4 METS Comment: No angina, No SOB, > 4 METS   Hematologic:  - neg hematologic  ROS     Musculoskeletal:  - neg musculoskeletal ROS     GI/Hepatic:     (+) GERD, Asymptomatic on medication,                  Renal/Genitourinary:  - neg Renal ROS     Endo:     (+)               Obesity,       Psychiatric/Substance Use:  - neg psychiatric ROS     Infectious Disease:  - neg infectious disease ROS     Malignancy:       Other:            Physical Exam    Airway        Mallampati: III   TM distance: > 3 FB   Neck ROM: full   Mouth opening: > 3 cm    Respiratory Devices and Support         Dental       (+) Multiple visibly decayed, broken teeth    B=Bridge, C=Chipped, L=Loose, M=Missing    Cardiovascular          Rhythm and rate: regular      Pulmonary           breath sounds clear to auscultation           OUTSIDE LABS:  CBC:   Lab Results   Component Value Date    WBC 8.1 05/22/2023    WBC 5.0 07/21/2022    HGB 12.3 05/22/2023    HGB 13.2 07/21/2022    HCT 37.2 05/22/2023    HCT 39.3 07/21/2022     (L) 05/22/2023     07/21/2022     BMP:   Lab Results   Component Value Date     05/22/2023     07/21/2022    POTASSIUM 4.0 05/22/2023    POTASSIUM 3.1 (L) 07/21/2022    CHLORIDE 106 05/22/2023    CHLORIDE 101 07/21/2022    CO2 25 05/22/2023    CO2 26 07/21/2022    BUN 10.1 05/22/2023    BUN 6 (L) 07/21/2022    CR 0.71 05/22/2023    CR 0.64 07/21/2022     (H) 05/22/2023    GLC 87 07/21/2022     COAGS:   Lab Results   Component Value Date    INR 0.99 07/21/2022     POC:   Lab Results   Component Value Date    HCG Negative 06/20/2011     HEPATIC:   Lab Results   Component Value Date    ALBUMIN 3.9 05/22/2023    PROTTOTAL 7.2 05/22/2023    ALT 25 05/22/2023    AST 42 (H) 05/22/2023    ALKPHOS 80 05/22/2023    BILITOTAL 0.2 05/22/2023     OTHER:   Lab Results   Component Value Date    THEA 9.4 05/22/2023    LIPASE 330 07/21/2022       Anesthesia Plan    ASA Status:  3    NPO Status:  NPO Appropriate    Anesthesia Type: General.     - Airway: ETT   Induction: Intravenous, Propofol.   Maintenance: Balanced.   Techniques and Equipment:     - Airway: Video-Laryngoscope       Consents    Anesthesia Plan(s) and associated risks, benefits, and realistic alternatives discussed. Questions answered and patient/representative(s) expressed understanding.     - Discussed:     - Discussed with:  Patient       - Patient is DNR/DNI Status: No          Postoperative Care    Pain management: Multi-modal analgesia.   PONV prophylaxis: Ondansetron (or other 5HT-3), Dexamethasone or Solumedrol, Background Propofol Infusion     Comments:               Constance Ng MD                # Overweight: Estimated body mass index is 29.75 kg/m   "as calculated from the following:    Height as of this encounter: 1.651 m (5' 5\").    Weight as of this encounter: 81.1 kg (178 lb 12.8 oz).      "

## 2024-12-02 ENCOUNTER — TELEPHONE (OUTPATIENT)
Dept: DERMATOLOGY | Facility: CLINIC | Age: 54
End: 2024-12-02
Payer: COMMERCIAL

## 2024-12-02 ENCOUNTER — HOSPITAL ENCOUNTER (EMERGENCY)
Facility: CLINIC | Age: 54
Discharge: HOME | End: 2024-12-02
Payer: COMMERCIAL

## 2024-12-02 VITALS
OXYGEN SATURATION: 99 % | DIASTOLIC BLOOD PRESSURE: 111 MMHG | WEIGHT: 175 LBS | BODY MASS INDEX: 29.16 KG/M2 | TEMPERATURE: 98.6 F | HEART RATE: 102 BPM | RESPIRATION RATE: 16 BRPM | SYSTOLIC BLOOD PRESSURE: 178 MMHG | HEIGHT: 65 IN

## 2024-12-02 DIAGNOSIS — K62.89 RECTAL PAIN: ICD-10-CM

## 2024-12-02 DIAGNOSIS — K60.2 ANAL FISSURE: ICD-10-CM

## 2024-12-02 PROBLEM — E78.2 MIXED HYPERLIPIDEMIA: Status: ACTIVE | Noted: 2018-11-03

## 2024-12-02 PROCEDURE — 99283 EMERGENCY DEPT VISIT LOW MDM: CPT

## 2024-12-02 PROCEDURE — 99284 EMERGENCY DEPT VISIT MOD MDM: CPT

## 2024-12-02 RX ORDER — OXYCODONE HYDROCHLORIDE 5 MG/1
5 TABLET ORAL EVERY 6 HOURS PRN
Qty: 10 TABLET | Refills: 0 | Status: SHIPPED | OUTPATIENT
Start: 2024-12-02

## 2024-12-02 ASSESSMENT — COLUMBIA-SUICIDE SEVERITY RATING SCALE - C-SSRS
1. IN THE PAST MONTH, HAVE YOU WISHED YOU WERE DEAD OR WISHED YOU COULD GO TO SLEEP AND NOT WAKE UP?: NO
6. HAVE YOU EVER DONE ANYTHING, STARTED TO DO ANYTHING, OR PREPARED TO DO ANYTHING TO END YOUR LIFE?: NO
2. HAVE YOU ACTUALLY HAD ANY THOUGHTS OF KILLING YOURSELF IN THE PAST MONTH?: NO

## 2024-12-02 ASSESSMENT — ACTIVITIES OF DAILY LIVING (ADL)
ADLS_ACUITY_SCORE: 41
ADLS_ACUITY_SCORE: 41

## 2024-12-02 NOTE — ED TRIAGE NOTES
Pt c/o rectal pain that began last week. She also mentions pelvic pressure that is located primarily on the R side. Pt. States she has a history of rectal fissures.

## 2024-12-02 NOTE — ED PROVIDER NOTES
History     Chief Complaint   Patient presents with    Rectal Bleeding    Rectal/perineal Pain    Pelvic Pain       Gladys Mullins is a 54 year old female with significant pmhx of HTN, HLD, tobacco use who presents for evaluation of rectal pain.  Patient states she developed rectal pain approximately 7 days ago that feels similar to when she had an anal fissure several years ago.  Pain is progressively worsening.  The pain is most severe when she is actively having a bowel movement.  She states she also gets pressure in her anterior pelvic area that is relieved after a bowel movement.  There has been a small amount of bright red blood in her stool.  She has been taking stool softeners and has been able to pass soft stools.  She is also been doing daily Epsom salt soaks and taking ibuprofen and Tylenol with minimal relief.  States in the past she was referred to a colorectal surgeon who diagnosed her with a nasal fissure for similar pain and was treated with a rectal ointment that contained a blood pressure medication.  States shortly after starting that medication her symptoms resolved.  She denies associated abdominal pain, fever, nausea/vomiting, dysuria, hematuria, melena.    Allergies:  Allergies   Allergen Reactions    Codeine Phosphate GI Disturbance       Problem List:    Patient Active Problem List    Diagnosis Date Noted    Mixed hyperlipidemia 11/03/2018     Priority: Medium    HTN (hypertension) 11/16/2009     Priority: Medium    Tobacco abuse 10/29/2008     Priority: Medium        Past Medical History:    Past Medical History:   Diagnosis Date    Bronchitis     Cervical intraepithelial neoplasia grade 1     Hypertension     Mixed hyperlipidemia     Rectocele     Tobacco abuse        Past Surgical History:    Past Surgical History:   Procedure Laterality Date    COLPORRHAPHY POSTERIOR N/A 11/30/2023    Procedure: POSTERIOR VAGINAL REPAIR, PARAVAGINAL REPAIR;  Surgeon: Barrera Carmona MD;  Location:  "[facility] OR    COSMETIC SURGERY      on face    CREATION, VAGINAL WALL SLING N/A 11/30/2023    Procedure: PUBOVAGINAL SLING AND;  Surgeon: Barrera Carmona MD;  Location: [facility] OR    ESSURE TUBAL LIGATION         Family History:    No family history on file.    Social History:  Marital Status:  Single [1]  Social History     Tobacco Use    Smoking status: Every Day    Smokeless tobacco: Never   Substance Use Topics    Alcohol use: Yes     Comment: social    Drug use: No        Medications:    COMPOUNDED NON-CONTROLLED SUBSTANCE (CMPD RX) - PHARMACY TO MIX COMPOUNDED MEDICATION  oxyCODONE (ROXICODONE) 5 MG tablet  co-enzyme Q-10 100 MG CAPS capsule  estradiol (VIVELLE-DOT) 0.025 MG/24HR bi-weekly patch  ibuprofen (ADVIL/MOTRIN) 800 MG tablet  lisinopril-hydrochlorothiazide (ZESTORETIC) 20-12.5 MG tablet  omeprazole (PRILOSEC) 40 MG DR capsule  ondansetron (ZOFRAN ODT) 4 MG ODT tab  oxyCODONE (ROXICODONE) 5 MG tablet  progesterone (PROMETRIUM) 100 MG capsule  rosuvastatin (CRESTOR) 5 MG tablet  senna-docusate (SENOKOT-S/PERICOLACE) 8.6-50 MG tablet  traZODone (DESYREL) 50 MG tablet  venlafaxine (EFFEXOR XR) 75 MG 24 hr capsule          Physical Exam   BP: (!) 178/111  Pulse: 105  Temp: 98.6  F (37  C)  Resp: 16  Height: 165.1 cm (5' 5\")  Weight: 79.4 kg (175 lb)  SpO2: 97 %      Physical Exam  Vitals and nursing note reviewed. Exam conducted with a chaperone present.   Constitutional:       General: She is not in acute distress.     Appearance: She is not ill-appearing, toxic-appearing or diaphoretic.   HENT:      Head: Normocephalic and atraumatic.   Eyes:      Extraocular Movements: Extraocular movements intact.      Pupils: Pupils are equal, round, and reactive to light.   Pulmonary:      Effort: Pulmonary effort is normal.   Abdominal:      Palpations: Abdomen is soft.      Tenderness: There is no abdominal tenderness. There is no guarding or rebound.   Genitourinary:     Rectum: Tenderness (with " digital exam) and external hemorrhoid (not thrombosed) present. No mass.      Comments: No blood on glove following digital exam.  Musculoskeletal:         General: Normal range of motion.      Cervical back: Normal range of motion.   Neurological:      General: No focal deficit present.      Mental Status: She is alert and oriented to person, place, and time.   Psychiatric:         Mood and Affect: Mood normal.         Behavior: Behavior normal.         ED Course        Procedures                    No results found for this or any previous visit (from the past 24 hours).    Medications - No data to display    Assessments & Plan (with Medical Decision Making)     I have reviewed the nursing notes.    I have reviewed the findings, diagnosis, plan and need for follow up with the patient.    Medical Decision Making  Gladys Mullins is a 54 year old female with significant pmhx of HTN, HLD, tobacco use who presents for evaluation of rectal pain.  Differential diagnoses include external versus internal hemorrhoid, abscess, anal fissure, IBD, diverticulitis.  Vital signs with hypertension at 178/111, likely secondary to pain.Patient is afebrile, she is not tachycardic, and she is satting 99% on room air with a regular respiratory rate and effort.    On examination patient is mildly uncomfortable appearing, but sitting up in the bed.  No acute distress.  Abdomen is soft and nontender.  No pelvic area tenderness.  Rectal exam was performed with JESSICA Mccormick chaperoning.  She had signs of nonthrombosed external hemorrhoids, no obvious anal fissure.  Patient exhibited significant pain with minimal digital exam.  No palpable masses.  No blood or stool on glove.    Suspect patient's pain is likely due to disorder such as anal fissure, especially given prior history with similar symptoms.  We did discuss other sources of rectal area pain and pelvic pressure such as UTI, colitis, diverticulitis that would require further testing to  rule out.  However, patient does not have other concerning symptoms such as urinary changes, melena, maroon stools, fever, abdominal pain.  Patient would prefer to manage this as an anal fissure initially before pursuing further workup given similarities to her previous episode.  Unfortunately I am unable to see the consult notes from the previous colorectal surgeon.  Will plan to treat with compounded 0.2% nifedipine with lidocaine, this was sent to our main compounding pharmacy.  Recommended she continue stool softeners, increased fiber, sitz bath's, ibuprofen and Tylenol as needed.  She was provided with a few tablets of 5 mg oxycodone as needed for severe pain.  She is planning to schedule close follow-up with her primary provider later this week, as she gets most of her care through [facility].  She was instructed to return to the ER if develop severely worsening pain, large amounts of blood in stool, melena, abdominal pain, fever, nausea/vomiting, or if other concerning symptoms develop.  Patient voiced understanding the plan and had no further questions.      New Prescriptions    COMPOUNDED NON-CONTROLLED SUBSTANCE (CMPD RX) - PHARMACY TO MIX COMPOUNDED MEDICATION    Apply Nifedipine 0.2% ointment with 2% or 5% lidocaine to the rectal area 2-4 times a day    OXYCODONE (ROXICODONE) 5 MG TABLET    Take 1 tablet (5 mg) by mouth every 6 hours as needed for severe pain.       Final diagnoses:   Rectal pain   Anal fissure       Anabell Whitlock PA-C  December 2, 2024  [facility] EMERGENCY DEPT     Anabell Whitlock PA-C  12/02/24 [id number]

## 2024-12-02 NOTE — DISCHARGE INSTRUCTIONS
Please call our Compounding Pharm[facility] at [phone] to determine how you want your medication sent to you.     Continue taking Ibuprofen 600mg and/or Tylenol 500-1000mg every 6 hours as needed for pain. For severe pain you can take Oxycodone 5mg every 6-8 hours as needed.     Continue daily sitz baths. Continue taking a stool softener and increase fiber intake.     Schedule a follow up appointment with your primary care provider. They can refer you to a GI or colorectal surgeon if your symptoms are not improving.     Return to the ER if you develop severe uncontrollable pain, passage of large amounts of blood, black/tarry stools, abdominal pain, fever, nausea/vomiting, or if other concerning symptoms develop.

## 2024-12-02 NOTE — TELEPHONE ENCOUNTER
Hello,    We received a prescription for Nifedipine 0.2% with lidocaine, but we need clarification on the percentage of lidocaine you want to prescribe to the patient. Please resend with only one percentage listed for the lidocaine. Please call us at [phone] if you have any questions.    Thank you,    Claudette Hartmann CPhT, TAYLOR    [facility]  [address] [place] [id number]   [email or web address]  [email or web address]   Phone: [phone]  Fax: [phone]

## (undated) DEVICE — SU MONOCRYL+ 4-0 18IN PS2 UND MCP496G

## (undated) DEVICE — SUCTION MANIFOLD NEPTUNE 2 SYS 1 PORT 702-025-000

## (undated) DEVICE — SOL NACL 0.9% INJ 250ML BAG 2B1322Q

## (undated) DEVICE — SUTURE VICRYL+ 2-0 27IN CT-1 UND VCP259H

## (undated) DEVICE — DRAPE SHEET REV FOLD 3/4 9349

## (undated) DEVICE — NEEDLE HYPO 18X1-1/2 SAFETY 305918

## (undated) DEVICE — ESU PENCIL SMOKE EVAC W/ROCKER SWITCH 0703-047-000

## (undated) DEVICE — CATH FOLEY 16FR 5ML LUBRICATH LATEX 0165L16

## (undated) DEVICE — GLOVE SURG PI ULTRA TOUCH M SZ 7-1/2 LF

## (undated) DEVICE — SOL WATER IRRIG 3000ML BAG 2B7117

## (undated) DEVICE — PLATE GROUNDING ADULT W/CORD 9165L

## (undated) DEVICE — MARKER SURG SKIN 2 TIP STRL SPP99DT2AA

## (undated) DEVICE — BLADE KNIFE SURG 10 371110

## (undated) DEVICE — DRAPE POUCH INSTRUMENT 3 POCKET 1018L

## (undated) DEVICE — PREP DYNA-HEX 4% CHG SCRUB 4OZ BOTTLE MDS098710

## (undated) DEVICE — SOLUTION IV 2B0304X STRL WATER 1000ML

## (undated) DEVICE — NEEDLE HYPO MAGELLAN SAFETY 22GA 1 1/2IN 8881850215

## (undated) DEVICE — GOWN IMPERVIOUS BREATHABLE SMART LG 89015

## (undated) DEVICE — SYR 10ML FINGER CONTROL W/O NDL 309695

## (undated) DEVICE — DECANTER BAG 2002S

## (undated) DEVICE — BLADE CLIPPER PIVOT PURPLE DISP 9660

## (undated) DEVICE — A3 SUPPLIES- SEE NURSING INFO PAGE

## (undated) DEVICE — SYR 50ML LL W/O NDL 309653

## (undated) DEVICE — GLOVE UNDER INDICATOR PI SZ 7.0 LF 41670

## (undated) DEVICE — DECANTER VIAL 2006S

## (undated) DEVICE — CUSTOM PACK TVT CUSTOM SOT5BTVHEA

## (undated) DEVICE — GOWN IMPERVIOUS BREATHABLE SMART XLG 89045

## (undated) DEVICE — GLOVE SURGEON PI ORTHO SZ 6-1/2 LF

## (undated) DEVICE — RX SURGIFLO HEMOSTATIC MATRIX W/THROMBIN 8ML 2994

## (undated) DEVICE — MAT FLOOR SURGICAL 40X38 0702140238

## (undated) DEVICE — SOL WATER IRRIG 1000ML BOTTLE 2F7114

## (undated) DEVICE — MITT PRE-OP 7 L X 5 1/2 W 5177M1

## (undated) DEVICE — SOL NACL 0.9% 100ML BAG 2B1302

## (undated) RX ORDER — ONDANSETRON 2 MG/ML
INJECTION INTRAMUSCULAR; INTRAVENOUS
Status: DISPENSED
Start: 2023-11-30

## (undated) RX ORDER — FENTANYL CITRATE 50 UG/ML
INJECTION, SOLUTION INTRAMUSCULAR; INTRAVENOUS
Status: DISPENSED
Start: 2023-11-30

## (undated) RX ORDER — FENTANYL CITRATE-0.9 % NACL/PF 10 MCG/ML
PLASTIC BAG, INJECTION (ML) INTRAVENOUS
Status: DISPENSED
Start: 2023-11-30

## (undated) RX ORDER — EPHEDRINE SULFATE 50 MG/ML
INJECTION, SOLUTION INTRAMUSCULAR; INTRAVENOUS; SUBCUTANEOUS
Status: DISPENSED
Start: 2023-11-30

## (undated) RX ORDER — DEXAMETHASONE SODIUM PHOSPHATE 10 MG/ML
INJECTION, SOLUTION INTRAMUSCULAR; INTRAVENOUS
Status: DISPENSED
Start: 2023-11-30

## (undated) RX ORDER — PROPOFOL 10 MG/ML
INJECTION, EMULSION INTRAVENOUS
Status: DISPENSED
Start: 2023-11-30